# Patient Record
Sex: FEMALE | Race: BLACK OR AFRICAN AMERICAN | Employment: UNEMPLOYED | URBAN - METROPOLITAN AREA
[De-identification: names, ages, dates, MRNs, and addresses within clinical notes are randomized per-mention and may not be internally consistent; named-entity substitution may affect disease eponyms.]

---

## 2017-05-19 ENCOUNTER — APPOINTMENT (OUTPATIENT)
Dept: GENERAL RADIOLOGY | Age: 52
End: 2017-05-19
Attending: EMERGENCY MEDICINE
Payer: SUBSIDIZED

## 2017-05-19 ENCOUNTER — HOSPITAL ENCOUNTER (EMERGENCY)
Age: 52
Discharge: HOME | End: 2017-05-20
Attending: EMERGENCY MEDICINE
Payer: SUBSIDIZED

## 2017-05-19 DIAGNOSIS — R07.89 ATYPICAL CHEST PAIN: Primary | ICD-10-CM

## 2017-05-19 LAB
ALBUMIN SERPL BCP-MCNC: 3.7 G/DL (ref 3.5–5)
ALBUMIN/GLOB SERPL: 1.1 {RATIO} (ref 1.1–2.2)
ALP SERPL-CCNC: 55 U/L (ref 45–117)
ALT SERPL-CCNC: 22 U/L (ref 12–78)
ANION GAP BLD CALC-SCNC: 10 MMOL/L (ref 5–15)
AST SERPL W P-5'-P-CCNC: 17 U/L (ref 15–37)
BASOPHILS # BLD AUTO: 0 K/UL (ref 0–0.1)
BASOPHILS # BLD: 1 % (ref 0–1)
BILIRUB SERPL-MCNC: 0.2 MG/DL (ref 0.2–1)
BUN SERPL-MCNC: 15 MG/DL (ref 6–20)
BUN/CREAT SERPL: 19 (ref 12–20)
CALCIUM SERPL-MCNC: 8.7 MG/DL (ref 8.5–10.1)
CHLORIDE SERPL-SCNC: 110 MMOL/L (ref 97–108)
CK MB CFR SERPL CALC: 0.2 % (ref 0–2.5)
CK MB SERPL-MCNC: 1.3 NG/ML (ref 5–25)
CK SERPL-CCNC: 526 U/L (ref 26–192)
CO2 SERPL-SCNC: 24 MMOL/L (ref 21–32)
CREAT SERPL-MCNC: 0.8 MG/DL (ref 0.55–1.02)
EOSINOPHIL # BLD: 0.2 K/UL (ref 0–0.4)
EOSINOPHIL NFR BLD: 3 % (ref 0–7)
ERYTHROCYTE [DISTWIDTH] IN BLOOD BY AUTOMATED COUNT: 14.5 % (ref 11.5–14.5)
GLOBULIN SER CALC-MCNC: 3.5 G/DL (ref 2–4)
GLUCOSE SERPL-MCNC: 110 MG/DL (ref 65–100)
HCT VFR BLD AUTO: 32.1 % (ref 35–47)
HGB BLD-MCNC: 10.8 G/DL (ref 11.5–16)
LYMPHOCYTES # BLD AUTO: 38 % (ref 12–49)
LYMPHOCYTES # BLD: 2.4 K/UL (ref 0.8–3.5)
MCH RBC QN AUTO: 29.7 PG (ref 26–34)
MCHC RBC AUTO-ENTMCNC: 33.6 G/DL (ref 30–36.5)
MCV RBC AUTO: 88.2 FL (ref 80–99)
MONOCYTES # BLD: 0.4 K/UL (ref 0–1)
MONOCYTES NFR BLD AUTO: 7 % (ref 5–13)
NEUTS SEG # BLD: 3.2 K/UL (ref 1.8–8)
NEUTS SEG NFR BLD AUTO: 51 % (ref 32–75)
PLATELET # BLD AUTO: 216 K/UL (ref 150–400)
POTASSIUM SERPL-SCNC: 3.7 MMOL/L (ref 3.5–5.1)
PROT SERPL-MCNC: 7.2 G/DL (ref 6.4–8.2)
RBC # BLD AUTO: 3.64 M/UL (ref 3.8–5.2)
SODIUM SERPL-SCNC: 144 MMOL/L (ref 136–145)
TROPONIN I SERPL-MCNC: <0.04 NG/ML
TROPONIN I SERPL-MCNC: <0.04 NG/ML
WBC # BLD AUTO: 6.3 K/UL (ref 3.6–11)

## 2017-05-19 PROCEDURE — 80053 COMPREHEN METABOLIC PANEL: CPT | Performed by: EMERGENCY MEDICINE

## 2017-05-19 PROCEDURE — 99285 EMERGENCY DEPT VISIT HI MDM: CPT

## 2017-05-19 PROCEDURE — 82553 CREATINE MB FRACTION: CPT | Performed by: EMERGENCY MEDICINE

## 2017-05-19 PROCEDURE — 85025 COMPLETE CBC W/AUTO DIFF WBC: CPT | Performed by: EMERGENCY MEDICINE

## 2017-05-19 PROCEDURE — 36415 COLL VENOUS BLD VENIPUNCTURE: CPT | Performed by: EMERGENCY MEDICINE

## 2017-05-19 PROCEDURE — 71020 XR CHEST PA LAT: CPT

## 2017-05-19 PROCEDURE — 84484 ASSAY OF TROPONIN QUANT: CPT | Performed by: EMERGENCY MEDICINE

## 2017-05-19 PROCEDURE — 82550 ASSAY OF CK (CPK): CPT | Performed by: EMERGENCY MEDICINE

## 2017-05-19 PROCEDURE — 74011250637 HC RX REV CODE- 250/637: Performed by: EMERGENCY MEDICINE

## 2017-05-19 PROCEDURE — 93005 ELECTROCARDIOGRAM TRACING: CPT

## 2017-05-19 RX ORDER — DIAZEPAM 5 MG/1
5 TABLET ORAL
Status: COMPLETED | OUTPATIENT
Start: 2017-05-19 | End: 2017-05-19

## 2017-05-19 RX ADMIN — DIAZEPAM 5 MG: 5 TABLET ORAL at 20:32

## 2017-05-20 VITALS
SYSTOLIC BLOOD PRESSURE: 125 MMHG | DIASTOLIC BLOOD PRESSURE: 76 MMHG | TEMPERATURE: 98 F | HEIGHT: 63 IN | BODY MASS INDEX: 38.98 KG/M2 | OXYGEN SATURATION: 100 % | HEART RATE: 73 BPM | RESPIRATION RATE: 15 BRPM | WEIGHT: 220 LBS

## 2017-05-20 LAB
ATRIAL RATE: 86 BPM
CALCULATED P AXIS, ECG09: 43 DEGREES
CALCULATED R AXIS, ECG10: -3 DEGREES
CALCULATED T AXIS, ECG11: 17 DEGREES
DIAGNOSIS, 93000: NORMAL
P-R INTERVAL, ECG05: 146 MS
Q-T INTERVAL, ECG07: 364 MS
QRS DURATION, ECG06: 74 MS
QTC CALCULATION (BEZET), ECG08: 435 MS
VENTRICULAR RATE, ECG03: 86 BPM

## 2017-05-20 NOTE — ED NOTES
Updated pt that are waiting for repeat lab results to come back, prior to providing with crackers. Pt voices understanding.

## 2017-05-20 NOTE — ED NOTES
Patient reports CP 3/10 at this time. She is in NAD and family is at bedside. VSS. SR on cardiac monitor. Repeat Troponin due at 2230. Will continue to monitor.

## 2017-05-20 NOTE — ED NOTES
Bedside shift change report given to Bertram Rose RN (oncoming nurse) by Saira Vang RN (offgoing nurse). Report included the following information SBAR, ED Summary, Intake/Output, MAR, Recent Results and Cardiac Rhythm NSR.

## 2017-05-20 NOTE — ED NOTES
Assumed care of pt. Pt resting in stretcher. Call bell within reach. Awaiting repeat lab. Pain currently a 1/10, denying need for any medications at this time. Requesting some crackers.  Will speak with MD.

## 2017-05-20 NOTE — ED NOTES
Spoke with Dr. Manju Celeste about pt ready to go home, and recent lab result. Dr. Manju Celeste reporting will get pt discharge paperwork ready. Pt relayed information. No other complaints voiced at this time.

## 2017-05-20 NOTE — ED NOTES
Pt resting in stretcher. Call bell within reach. Updated on plan of care. Awaiting reeval by MD.     Provided with saltines for comfort measures. No other complaints voiced at this time.

## 2017-05-20 NOTE — ED NOTES
Repeat Troponin drawn from left AC PIV. Patient watching television with family and is in NAD. She rates CP 1/10. VSS. Updated patient and family on time frame for Troponin result. Will continue to monitor.

## 2017-05-20 NOTE — ED NOTES
Discharge instructions reviewed with pt and given by Dr. Luis Thomas. IV discontinued with catheter intact. Taken off of monitor. Escorted out of ED in wheelchair with tech. Adult female visitors to assist pt home.

## 2017-05-20 NOTE — ED PROVIDER NOTES
HPI Comments: Meri Mata is a 47 y.o. female with h/o DM and HTN who presents via EMS to the ED c/o tight sharp CP with onset during an argument with her son x ~2.5 hours PTA. Additionally, she notes SOB and diaphoresis with onset of CP. Pt reports her CP waxing and waning during her argument with her son, which lasted a total of about ten minutes. She states having similar chest pain in the past triggered by arguments with her son due to her being anxious and getting to excited. She reports feeling better now, and denies sharp CP, SOB or diaphoresis. Pt denies doing any recent heavy lifting. Pt denies FHx of MI. Pt denies h/o blood clots, any recent travel or surgeries or being on any hormone therapy. Pt denies abd pain, nausea, vomiting, diarrhea, fever, chills or any urinary sxs. PCP: None    Social Hx: -tobacco, -EtOH, -Illicit Drugs      There are no other complaints, changes or physical findings at this time. The history is provided by the patient. Past Medical History:   Diagnosis Date    Diabetes ([address]     Hypertension        Past Surgical History:   Procedure Laterality Date    ABDOMEN SURGERY PROC UNLISTED      Cholecystectomy    HX GYN       x2    HX ORTHOPAEDIC      left foot surgery         History reviewed. No pertinent family history. Social History     Social History    Marital status:      Spouse name: N/A    Number of children: N/A    Years of education: N/A     Occupational History    Not on file. Social History Main Topics    Smoking status: Never Smoker    Smokeless tobacco: Not on file    Alcohol use No    Drug use: No    Sexual activity: Not on file     Other Topics Concern    Not on file     Social History Narrative         ALLERGIES: Pcn [penicillins]    Review of Systems   Constitutional: Positive for diaphoresis (resolved). Negative for chills, fatigue and fever. HENT: Negative for congestion, rhinorrhea and sore throat.     Eyes: Negative for pain, discharge and visual disturbance. Respiratory: Positive for chest tightness and shortness of breath (resolved). Negative for cough and wheezing. Cardiovascular: Positive for chest pain (sharp pain resolved). Negative for palpitations and leg swelling. Gastrointestinal: Negative for abdominal pain, constipation, diarrhea, nausea and vomiting. Genitourinary: Negative for dysuria, frequency and hematuria. Musculoskeletal: Negative for arthralgias, back pain and myalgias. Skin: Negative for rash. Neurological: Negative for dizziness, weakness, light-headedness and headaches. Psychiatric/Behavioral: Negative. All other systems reviewed and are negative. Vitals:    05/19/17 2230 05/19/17 2300 05/19/17 2330 05/20/17 0001   BP: 121/79 124/85 130/82 125/76   Pulse: 73 77 72 73   Resp: 20 19 24 15   Temp:       SpO2: 100% 99% 100% 100%   Weight:       Height:                Physical Exam   Constitutional: She is oriented to person, place, and time. She appears well-developed and well-nourished. No distress. HENT:   Head: Normocephalic and atraumatic. Eyes: EOM are normal. Right eye exhibits no discharge. Left eye exhibits no discharge. No scleral icterus. Neck: Normal range of motion. Neck supple. No tracheal deviation present. Cardiovascular: Normal rate, regular rhythm, normal heart sounds and intact distal pulses. Exam reveals no gallop and no friction rub. No murmur heard. Pulmonary/Chest: Effort normal and breath sounds normal. No respiratory distress. She has no wheezes. She has no rales. L anterior chest wall tenderness   Abdominal: Soft. She exhibits no distension. There is no tenderness. Musculoskeletal: Normal range of motion. She exhibits no edema. Lymphadenopathy:     She has no cervical adenopathy. Neurological: She is alert and oriented to person, place, and time. No focal neuro deficits   Skin: Skin is warm and dry. No rash noted.    Psychiatric: She has a normal mood and affect. Anxious   Nursing note and vitals reviewed. MDM  Number of Diagnoses or Management Options  Atypical chest pain:   Diagnosis management comments:     Patient presents to ED with left sided chest pain that began while arguing with her son. She has reproducible chest wall tenderness on exam.  Differential includes musculoskeletal pain, costochondritis, anxiety, atypical chest pain, stable angina, unstable angina, MI, pleurisy, costochondritis, pneumonia, bronchitis, MSK pain. Do not suspect dissection or PE (Well's score 0). - CBC, CMP, troponin  - CXR           Amount and/or Complexity of Data Reviewed  Clinical lab tests: ordered and reviewed  Tests in the radiology section of CPT®: ordered and reviewed  Tests in the medicine section of CPT®: ordered and reviewed  Review and summarize past medical records: yes  Independent visualization of images, tracings, or specimens: yes    Patient Progress  Patient progress: stable      Procedures    EKG interpretation: (Preliminary) 19:09  Rhythm: normal sinus rhythm; and regular . Rate (approx.): 86 bpm; Axis: normal; CT interval: normal; QRS interval: normal ; ST/T wave: normal  Written by Naila Ferguson ED Scribe, as dictated by Mehdi Mcadams MD    PROGRESS NOTE:  10:00 PM  Troponin negative x 1. Patient reports she is feeling better and denies chest pain. Will repeat troponin and discharge with cardiology follow up (low Heart score) if troponin is negative. In anticipation of discharge, discussed the following with patient:    Discussed diagnosis, follow up, return instructions, test results, x-rays and medications with the patient and/or family. The patient and/or family have been given the opportunity to ask questions. The patient and/or family express understanding of the plan of care, follow-up appointments and return instructions.   The patient and/or family agree to follow up with cardiology as directed and to return immediately if the chest pain worsens. The patient and family express understanding that although cardiac testing at this time is negative, a cardiac problem could still be present and that a follow-up appointment with a cardiologist for further evaluation and risk factor modification is necessary to complete the evaluation of this complaint. Provided customary return to ED instructions. Ardena Osler, MD    SIGN OUT:  10:30 PM  Patient's presentation, labs/imaging and plan of care was reviewed with Joe Mckeon MD as part of sign out. They will wait for a repeat cardiac enzymes as part of the plan discussed with the patient. Joe Mckeon MD's assistance in completion of this plan is greatly appreciated but it should be noted that I will be the provider of record for this patient. This note is prepared by Isis Rees, acting as Scribe for MD Rui Clinton MD: The scribe's documentation has been prepared under my direction and personally reviewed by me in its entirety. I confirm that the note above accurately reflects all work, treatment, procedures, and medical decision making performed by me. Progress note:  12:27 AM  Pt noted to be feeling better, ready for discharge. Updated pt and/or family on all final lab and imaging findings. Will follow up as instructed. All questions have been answered, pt voiced understanding and agreement with plan. Specific return precautions provided as well as instructions to return to the ED should sx worsen at any time.   Written by Veronica Torres ED Scribe, as dictated by Joe Mckeon MD    LABORATORY TESTS:  Recent Results (from the past 12 hour(s))   EKG, 12 LEAD, INITIAL    Collection Time: 05/19/17  7:09 PM   Result Value Ref Range    Ventricular Rate 86 BPM    Atrial Rate 86 BPM    P-R Interval 146 ms    QRS Duration 74 ms    Q-T Interval 364 ms    QTC Calculation (Bezet) 435 ms    Calculated P Axis 43 degrees Calculated R Axis -3 degrees    Calculated T Axis 17 degrees    Diagnosis       Normal sinus rhythm  Possible Left atrial enlargement  Left ventricular hypertrophy  Abnormal ECG  No previous ECGs available     CBC WITH AUTOMATED DIFF    Collection Time: 05/19/17  8:27 PM   Result Value Ref Range    WBC 6.3 3.6 - 11.0 K/uL    RBC 3.64 (L) 3.80 - 5.20 M/uL    HGB 10.8 (L) 11.5 - 16.0 g/dL    HCT 32.1 (L) 35.0 - 47.0 %    MCV 88.2 80.0 - 99.0 FL    MCH 29.7 26.0 - 34.0 PG    MCHC 33.6 30.0 - 36.5 g/dL    RDW 14.5 11.5 - 14.5 %    PLATELET 850 599 - 561 K/uL    NEUTROPHILS 51 32 - 75 %    LYMPHOCYTES 38 12 - 49 %    MONOCYTES 7 5 - 13 %    EOSINOPHILS 3 0 - 7 %    BASOPHILS 1 0 - 1 %    ABS. NEUTROPHILS 3.2 1.8 - 8.0 K/UL    ABS. LYMPHOCYTES 2.4 0.8 - 3.5 K/UL    ABS. MONOCYTES 0.4 0.0 - 1.0 K/UL    ABS. EOSINOPHILS 0.2 0.0 - 0.4 K/UL    ABS. BASOPHILS 0.0 0.0 - 0.1 K/UL   METABOLIC PANEL, COMPREHENSIVE    Collection Time: 05/19/17  8:27 PM   Result Value Ref Range    Sodium 144 136 - 145 mmol/L    Potassium 3.7 3.5 - 5.1 mmol/L    Chloride 110 (H) 97 - 108 mmol/L    CO2 24 21 - 32 mmol/L    Anion gap 10 5 - 15 mmol/L    Glucose 110 (H) 65 - 100 mg/dL    BUN 15 6 - 20 MG/DL    Creatinine 0.80 0.55 - 1.02 MG/DL    BUN/Creatinine ratio 19 12 - 20      GFR est AA >60 >60 ml/min/1.73m2    GFR est non-AA >60 >60 ml/min/1.73m2    Calcium 8.7 8.5 - 10.1 MG/DL    Bilirubin, total 0.2 0.2 - 1.0 MG/DL    ALT (SGPT) 22 12 - 78 U/L    AST (SGOT) 17 15 - 37 U/L    Alk. phosphatase 55 45 - 117 U/L    Protein, total 7.2 6.4 - 8.2 g/dL    Albumin 3.7 3.5 - 5.0 g/dL    Globulin 3.5 2.0 - 4.0 g/dL    A-G Ratio 1.1 1.1 - 2.2     CK W/ REFLX CKMB    Collection Time: 05/19/17  8:27 PM   Result Value Ref Range     (H) 26 - 192 U/L   TROPONIN I    Collection Time: 05/19/17  8:27 PM   Result Value Ref Range    Troponin-I, Qt. <0.04 <0.05 ng/mL   CK-MB,QUANT.     Collection Time: 05/19/17  8:27 PM   Result Value Ref Range    CK - MB 1.3 <3.6 NG/ML    CK-MB Index 0.2 0 - 2.5     TROPONIN I    Collection Time: 05/19/17 10:43 PM   Result Value Ref Range    Troponin-I, Qt. <0.04 <0.05 ng/mL       IMAGING RESULTS:    XR CHEST PA LAT  CLINICAL HISTORY: Chest pain   INDICATION: Chest pain, hypertension, diabetes     COMPARISON: None     FINDINGS:   PA and lateral views of the chest are obtained. The cardiopericardial silhouette is within normal limits. There is no pleural  effusion, pneumothorax or focal consolidation present.     IMPRESSION  IMPRESSION: No acute intrathoracic disease. MEDICATIONS GIVEN:  Medications   diazePAM (VALIUM) tablet 5 mg (5 mg Oral Given 5/19/17 2032)       IMPRESSION:  1. Atypical chest pain        PLAN:  1. Current Discharge Medication List        2. Follow-up Information     Follow up With Details Comments Contact Info    None In 2 days Please follow up primary care provider None [phone] Patient stated that they have no PCP      Erma Rankin MD  Please follow up with cardiology as soon as possible [address]  [id number]  [address] [phone]      [facility] EMERGENCY DEPT  As needed, If symptoms worsen [address] [place] [address]  [phone]        Return to ED if worse       DISCHARGE NOTE:  12:27 AM  The patient's results have been reviewed with family and/or caregiver. They verbally convey their understanding and agreement of the patient's signs, symptoms, diagnosis, treatment, and prognosis. They additionally agree to follow up as recommended in the discharge instructions or to return to the Emergency Room should the patient's condition change prior to their follow-up appointment. The family and/or caregiver verbally agrees with the care-plan and all of their questions have been answered.  The discharge instructions have also been provided to the them along with educational information regarding the patient's diagnosis and a list of reasons why the patient would want to return to the ER prior to their follow-up appointment should their condition change. Written by SERGE Ramireziblillian, as dictated by Kamaljit Cooper MD.     This note is prepared by Santo Johnson, acting as Scribe for MD Kamaljit Nagy MD : The scribe's documentation has been prepared under my direction and personally reviewed by me in its entirety. I confirm that the note above accurately reflects all work, treatment, procedures, and medical decision making performed by me. This note will not be viewable in [address]

## 2017-05-20 NOTE — ED NOTES
Pt ringing out on call bell. Requesting how much longer. Advised that MD is in with critical patient and am uncertain of how much longer. Pt stating \"I'm ready to go home, I'm a diabetic. \"     Offered pt frozen dinner and orange juice. Pt declined. Requested some canelo crackers. Provided with canelo crackers. Advised when MD is next available will request that she come speak with pt. Pt reports understanding. No other requests at this time.

## 2017-05-20 NOTE — DISCHARGE INSTRUCTIONS
Chest Pain: Care Instructions  Your Care Instructions  There are many things that can cause chest pain. Some are not serious and will get better on their own in a few days. But some kinds of chest pain need more testing and treatment. Your doctor may have recommended a follow-up visit in the next 8 to 12 hours. If you are not getting better, you may need more tests or treatment. Even though your doctor has released you, you still need to watch for any problems. The doctor carefully checked you, but sometimes problems can develop later. If you have new symptoms or if your symptoms do not get better, get medical care right away. If you have worse or different chest pain or pressure that lasts more than 5 minutes or you passed out (lost consciousness), call 911 or seek other emergency help right away. A medical visit is only one step in your treatment. Even if you feel better, you still need to do what your doctor recommends, such as going to all suggested follow-up appointments and taking medicines exactly as directed. This will help you recover and help prevent future problems. How can you care for yourself at home? · Rest until you feel better. · Take your medicine exactly as prescribed. Call your doctor if you think you are having a problem with your medicine. · Do not drive after taking a prescription pain medicine. When should you call for help? Call 911 if:  · You passed out (lost consciousness). · You have severe difficulty breathing. · You have symptoms of a heart attack. These may include:  ¨ Chest pain or pressure, or a strange feeling in your chest.  ¨ Sweating. ¨ Shortness of breath. ¨ Nausea or vomiting. ¨ Pain, pressure, or a strange feeling in your back, neck, jaw, or upper belly or in one or both shoulders or arms. ¨ Lightheadedness or sudden weakness. ¨ A fast or irregular heartbeat.   After you call 911, the  may tell you to chew 1 adult-strength or 2 to 4 low-dose aspirin. Wait for an ambulance. Do not try to drive yourself. Call your doctor today if:  · You have any trouble breathing. · Your chest pain gets worse. · You are dizzy or lightheaded, or you feel like you may faint. · You are not getting better as expected. · You are having new or different chest pain. Where can you learn more? Go to http://carlitos-gaye.info/. Enter A120 in the search box to learn more about \"Chest Pain: Care Instructions. \"  Current as of: May 27, 2016  Content Version: 11.2  © 8762-3624 [employer]. Care instructions adapted under license by [employer] (which disclaims liability or warranty for this information). If you have questions about a medical condition or this instruction, always ask your healthcare professional. [address] any warranty or liability for your use of this information.

## 2019-01-31 ENCOUNTER — HOSPITAL ENCOUNTER (OUTPATIENT)
Dept: LAB | Age: 54
Discharge: HOME | End: 2019-01-31

## 2019-01-31 LAB
CREAT UR-MCNC: 114 MG/DL
MICROALBUMIN UR-MCNC: 1.96 MG/DL
MICROALBUMIN/CREAT UR-RTO: 17 MG/G (ref 0–30)

## 2019-01-31 PROCEDURE — 82570 ASSAY OF URINE CREATININE: CPT

## 2019-09-12 ENCOUNTER — HOSPITAL ENCOUNTER (OUTPATIENT)
Dept: LAB | Age: 54
Discharge: HOME | End: 2019-09-12

## 2019-09-12 LAB
CHOLEST SERPL-MCNC: 147 MG/DL
EST. AVERAGE GLUCOSE BLD GHB EST-MCNC: 128 MG/DL
HBA1C MFR BLD: 6.1 % (ref 4.2–6.3)
HDLC SERPL-MCNC: 56 MG/DL
HDLC SERPL: 2.6 {RATIO} (ref 0–5)
LDLC SERPL CALC-MCNC: 78.8 MG/DL (ref 0–100)
LIPID PROFILE,FLP: NORMAL
TRIGL SERPL-MCNC: 61 MG/DL (ref ?–150)
VLDLC SERPL CALC-MCNC: 12.2 MG/DL

## 2019-09-12 PROCEDURE — 83036 HEMOGLOBIN GLYCOSYLATED A1C: CPT

## 2019-09-12 PROCEDURE — 80061 LIPID PANEL: CPT

## 2021-02-26 ENCOUNTER — OFFICE VISIT (OUTPATIENT)
Dept: FAMILY MEDICINE CLINIC | Age: 56
End: 2021-02-26
Payer: MEDICAID

## 2021-02-26 VITALS
RESPIRATION RATE: 12 BRPM | HEART RATE: 74 BPM | DIASTOLIC BLOOD PRESSURE: 81 MMHG | BODY MASS INDEX: 40.57 KG/M2 | OXYGEN SATURATION: 100 % | WEIGHT: 229 LBS | SYSTOLIC BLOOD PRESSURE: 131 MMHG | TEMPERATURE: 96.8 F | HEIGHT: 63 IN

## 2021-02-26 DIAGNOSIS — Z80.0 FAMILY HISTORY OF COLON CANCER: ICD-10-CM

## 2021-02-26 DIAGNOSIS — E11.9 CONTROLLED TYPE 2 DIABETES MELLITUS WITHOUT COMPLICATION, WITHOUT LONG-TERM CURRENT USE OF INSULIN (HCC): ICD-10-CM

## 2021-02-26 DIAGNOSIS — Z11.59 NEED FOR HEPATITIS C SCREENING TEST: ICD-10-CM

## 2021-02-26 DIAGNOSIS — Z76.89 ENCOUNTER TO ESTABLISH CARE: Primary | ICD-10-CM

## 2021-02-26 DIAGNOSIS — E66.01 OBESITY, MORBID (HCC): ICD-10-CM

## 2021-02-26 DIAGNOSIS — I10 ESSENTIAL HYPERTENSION WITH GOAL BLOOD PRESSURE LESS THAN 130/80: ICD-10-CM

## 2021-02-26 DIAGNOSIS — M25.561 CHRONIC PAIN OF RIGHT KNEE: ICD-10-CM

## 2021-02-26 DIAGNOSIS — J45.20 MILD INTERMITTENT ASTHMA WITHOUT COMPLICATION: ICD-10-CM

## 2021-02-26 DIAGNOSIS — G89.29 CHRONIC PAIN OF RIGHT KNEE: ICD-10-CM

## 2021-02-26 DIAGNOSIS — Z85.3 HISTORY OF BREAST CANCER: ICD-10-CM

## 2021-02-26 PROBLEM — C50.919 BREAST CANCER (HCC): Status: ACTIVE | Noted: 2019-01-01

## 2021-02-26 PROCEDURE — 99204 OFFICE O/P NEW MOD 45 MIN: CPT | Performed by: NURSE PRACTITIONER

## 2021-02-26 RX ORDER — LETROZOLE 2.5 MG/1
1 TABLET, FILM COATED ORAL DAILY
COMMUNITY
Start: 2021-01-07

## 2021-02-26 RX ORDER — AMLODIPINE BESYLATE 5 MG/1
1 TABLET ORAL DAILY
COMMUNITY
Start: 2020-12-18 | End: 2021-02-26 | Stop reason: SDUPTHER

## 2021-02-26 RX ORDER — LISINOPRIL 2.5 MG/1
2.5 TABLET ORAL DAILY
Qty: 90 TAB | Refills: 3 | Status: SHIPPED | OUTPATIENT
Start: 2021-02-26 | End: 2021-03-02 | Stop reason: SINTOL

## 2021-02-26 RX ORDER — GABAPENTIN 300 MG/1
1 CAPSULE ORAL 3 TIMES DAILY
COMMUNITY
Start: 2020-12-18

## 2021-02-26 RX ORDER — ALBUTEROL SULFATE 90 UG/1
2 AEROSOL, METERED RESPIRATORY (INHALATION)
Qty: 1 INHALER | Refills: 5 | Status: SHIPPED | OUTPATIENT
Start: 2021-02-26 | End: 2022-05-27

## 2021-02-26 RX ORDER — IBUPROFEN 600 MG/1
1 TABLET ORAL
COMMUNITY
Start: 2020-12-18 | End: 2021-04-07 | Stop reason: ALTCHOICE

## 2021-02-26 RX ORDER — LORATADINE 10 MG/1
10 TABLET ORAL DAILY
COMMUNITY
End: 2022-03-02 | Stop reason: SDUPTHER

## 2021-02-26 RX ORDER — AMLODIPINE BESYLATE 5 MG/1
5 TABLET ORAL DAILY
Qty: 90 TAB | Refills: 3 | Status: SHIPPED | OUTPATIENT
Start: 2021-02-26 | End: 2022-03-02 | Stop reason: SDUPTHER

## 2021-02-26 RX ORDER — METFORMIN HYDROCHLORIDE 500 MG/1
500 TABLET ORAL
Qty: 270 TAB | Refills: 3 | Status: SHIPPED | OUTPATIENT
Start: 2021-02-26 | End: 2022-03-02 | Stop reason: SDUPTHER

## 2021-02-26 NOTE — PROGRESS NOTES
Chief Complaint   Patient presents with   [address]    Diabetes    Hypertension     PCP - [facility] until 11/2019. Oncology/Breast Surgeon - Dr. Deedee Anand performed hysterectomy. Pt would like penicillins removed allergy list.     1. Have you been to the ER, urgent care clinic since your last visit? Hospitalized since your last visit? No    2. Have you seen or consulted any other health care providers outside of the [address] [place] [address] since your last visit? Include any pap smears or colon screening. No    Flu shot- Pt declined  Colonoscopy - Pt scheduled for next month  NO NEW IMMUNIZATIONS SHOWN ON VIIS.      Health Maintenance Due   Topic Date Due    Hepatitis C Screening  1965    DTaP/Tdap/Td series (1 - Tdap) 01/02/1985    Breast Cancer Screen Mammogram  01/02/2004    Shingrix Vaccine Age 50> (1 of 2) 01/02/2014    Colorectal Cancer Screening Combo  01/02/2014

## 2021-02-26 NOTE — PATIENT INSTRUCTIONS
Knee Arthritis: Exercises  Introduction  Here are some examples of exercises for you to try. The exercises may be suggested for a condition or for rehabilitation. Start each exercise slowly. Ease off the exercises if you start to have pain. You will be told when to start these exercises and which ones will work best for you. How to do the exercises  Knee flexion with heel slide   1. Lie on your back with your knees bent. 2. Slide your heel back by bending your affected knee as far as you can. Then hook your other foot around your ankle to help pull your heel even farther back. 3. Hold for about 6 seconds, then rest for up to 10 seconds. 4. Repeat 8 to 12 times. 5. Switch legs and repeat steps 1 through 4, even if only one knee is sore. Quad sets   1. Sit with your affected leg straight and supported on the floor or a firm bed. Place a small, rolled-up towel under your knee. Your other leg should be bent, with that foot flat on the floor. 2. Tighten the thigh muscles of your affected leg by pressing the back of your knee down into the towel. 3. Hold for about 6 seconds, then rest for up to 10 seconds. 4. Repeat 8 to 12 times. 5. Switch legs and repeat steps 1 through 4, even if only one knee is sore. Straight-leg raises to the front   1. Lie on your back with your good knee bent so that your foot rests flat on the floor. Your affected leg should be straight. Make sure that your low back has a normal curve. You should be able to slip your hand in between the floor and the small of your back, with your palm touching the floor and your back touching the back of your hand. 2. Tighten the thigh muscles in your affected leg by pressing the back of your knee flat down to the floor. Hold your knee straight. 3. Keeping the thigh muscles tight and your leg straight, lift your affected leg up so that your heel is about 12 inches off the floor. Hold for about 6 seconds, then lower slowly.   4. Relax for up to 10 seconds between repetitions. 5. Repeat 8 to 12 times. 6. Switch legs and repeat steps 1 through 5, even if only one knee is sore. Active knee flexion   1. Lie on your stomach with your knees straight. If your kneecap is uncomfortable, roll up a washcloth and put it under your leg just above your kneecap. 2. Lift the foot of your affected leg by bending the knee so that you bring the foot up toward your buttock. If this motion hurts, try it without bending your knee quite as far. This may help you avoid any painful motion. 3. Slowly move your leg up and down. 4. Repeat 8 to 12 times. 5. Switch legs and repeat steps 1 through 4, even if only one knee is sore. Quadriceps stretch (facedown)   1. Lie flat on your stomach, and rest your face on the floor. 2. Wrap a towel or belt strap around the lower part of your affected leg. Then use the towel or belt strap to slowly pull your heel toward your buttock until you feel a stretch. 3. Hold for about 15 to 30 seconds, then relax your leg against the towel or belt strap. 4. Repeat 2 to 4 times. 5. Switch legs and repeat steps 1 through 4, even if only one knee is sore. Stationary exercise bike   1. If you do not have a stationary exercise bike at home, you can find one to ride at your local health club or community center. 2. Adjust the height of the bike seat so that your knee is slightly bent when your leg is extended downward. If your knee hurts when the pedal reaches the top, you can raise the seat so that your knee does not bend as much. 3. Start slowly. At first, try to do 5 to 10 minutes of cycling with little to no resistance. Then increase your time and the resistance bit by bit until you can do 20 to 30 minutes without pain. 4. If you start to have pain, rest your knee until your pain gets back to the level that is normal for you. Or cycle for less time or with less effort. Follow-up care is a key part of your treatment and safety.  Be sure to make and go to all appointments, and call your doctor if you are having problems. It's also a good idea to know your test results and keep a list of the medicines you take. Where can you learn more? Go to http://www.wylie.com/  Enter C159 in the search box to learn more about \"Knee Arthritis: Exercises. \"  Current as of: March 2, 2020               Content Version: 12.6  © 2006-2020 [employer]. Care instructions adapted under license by [employer] (which disclaims liability or warranty for this information). If you have questions about a medical condition or this instruction, always ask your healthcare professional. [address] any warranty or liability for your use of this information.

## 2021-02-26 NOTE — LETTER
NOTIFICATION RETURN TO WORK 
 
2/26/2021 9:32 AM 
 
Ms. Ana Mcnulty [address] [place] [address] [id number] To Whom It May Concern: 
 
Ana Mcnulty is currently under the care of [facility]. She was seen in office today, 2/26/21. If there are questions or concerns please have the patient contact our office. Sincerely, Carlos Lieberman NP

## 2021-02-26 NOTE — PROGRESS NOTES
Carline Madrid (: 1965) is a 61 y.o. female, established patient, here for evaluation of the following chief complaint(s):  Establish Care, Diabetes, and Hypertension       ASSESSMENT/PLAN:  1. Encounter to establish care    2. Controlled type 2 diabetes mellitus without complication, without long-term current use of insulin (HCC)  -     metFORMIN (GLUCOPHAGE) 500 mg tablet; Take 1 Tab by mouth three (3) times daily (with meals). , Normal, Disp-270 Tab, R-3  -     CBC WITH AUTOMATED DIFF; Future  -     METABOLIC PANEL, COMPREHENSIVE; Future  -     HEMOGLOBIN A1C WITH EAG; Future  -     TSH 3RD GENERATION; Future  -     LIPID PANEL; Future  -     MICROALBUMIN, UR, RAND W/ MICROALB/CREAT RATIO; Future  -     URINALYSIS W/ REFLEX CULTURE; Future  -     lisinopriL (PRINIVIL, ZESTRIL) 2.5 mg tablet; Take 1 Tab by mouth daily. , Normal, Disp-90 Tab, R-3    3. Essential hypertension with goal blood pressure less than 130/80  -     amLODIPine (NORVASC) 5 mg tablet; Take 1 Tab by mouth daily. , Normal, Disp-90 Tab, R-3  -     CBC WITH AUTOMATED DIFF; Future  -     METABOLIC PANEL, COMPREHENSIVE; Future  -     TSH 3RD GENERATION; Future  -     LIPID PANEL; Future  -     MICROALBUMIN, UR, RAND W/ MICROALB/CREAT RATIO; Future  -     URINALYSIS W/ REFLEX CULTURE; Future  -     lisinopriL (PRINIVIL, ZESTRIL) 2.5 mg tablet; Take 1 Tab by mouth daily. , Normal, Disp-90 Tab, R-3    4. Chronic pain of right knee  -     XR KNEE RT 3 V; Future    5. History of breast cancer    6. Mild intermittent asthma without complication  -     albuterol (PROVENTIL HFA, VENTOLIN HFA, PROAIR HFA) 90 mcg/actuation inhaler; Take 2 Puffs by inhalation every four (4) hours as needed for Wheezing or Shortness of Breath., Normal, Disp-1 Inhaler, R-5    7. Obesity, morbid ([address]    8. Family history of colon cancer    9. Need for hepatitis C screening test  -     HEPATITIS C AB;  Future        Return in about 4 months (around 2021). SUBJECTIVE/OBJECTIVE:  HPI  Patient comes in today to establish care, has diabetes, HTN, hx breast cancer. PCP - [id number] until 2019. Oncology/Breast Surgeon - Dr. Milton Mejia performed hysterectomy. Saw [facility] for echo/cardiac workup for chemo     Pt would like penicillins removed allergy list.     Hx HTN, diagnosed about 10 years ago. Taking amlodipine 5mg. Denies chest pains, SOB. Has some wheezing, but has hx of asthma. No cough, night sweats, unexplained weight loss    Hx diabetes since . Taking metformin 500mg TID with meals. Sometimes does not take 3 times daily. May miss a 3rd dose 3 days weekly. tolerating okay. No diarrhea. FBG usually 80-90s    Hx right breast cancer. HER2 positve. Dx in . Had lumpectomy and lymph node removal (negative bx) in 2019. Had chemo and radiation treatment. Last chemo treatment 2020 and last radiation treatment 2020. Had hysterectomy and BSO in Dec 2020. Had postop complications of wound infection. Had 6 different parasites. Family hx of breast cancer in [facility] and the [facility] and paternal cousins. Had mammogram done in 2020. Due for colonoscopy next month. States she has genetic carrier for colon cancer.   Brother dx with colon cancer at 22yo and father diagnosed with colon cancer age 37yo  Allergies   Allergen Reactions    Pcn [Penicillins] Itching       Past Medical History:   Diagnosis Date    Asthma     [facility] 2019    Diabetes ([facility]     Family history of breast cancer     Family history of colon cancer     Hypertension        Past Surgical History:   Procedure Laterality Date    HX BREAST LUMPECTOMY Right 2019    HX GYN       x2    HX HYSTERECTOMY  2020    HX LYMPH NODE DISSECTION Right     HX ORTHOPAEDIC      left foot surgery    [place] ABDOMEN SURGERY PROC UNLISTED      Cholecystectomy Social History     Socioeconomic History    Marital status:      Spouse name: Not on file    Number of children: Not on file    Years of education: Not on file    Highest education level: Not on file   Occupational History    Not on file   Social Needs    Financial resource strain: Not on file    Food insecurity     Worry: Not on file     Inability: Not on file    Transportation needs     Medical: Not on file     Non-medical: Not on file   Tobacco Use    Smoking status: Never Smoker    Smokeless tobacco: Never Used   Substance and Sexual Activity    Alcohol use: No    Drug use: No    Sexual activity: Not Currently   Lifestyle    Physical activity     Days per week: Not on file     Minutes per session: Not on file    Stress: Not on file   Relationships    Social connections     Talks on phone: Not on file     Gets together: Not on file     Attends Yazdanism service: Not on file     Active member of club or organization: Not on file     Attends meetings of clubs or organizations: Not on file     Relationship status: Not on file    Intimate partner violence     Fear of current or ex partner: Not on file     Emotionally abused: Not on file     Physically abused: Not on file     Forced sexual activity: Not on file   Other Topics Concern    Not on file   Social History Narrative    Not on file       Family History   Problem Relation Age of Onset    Colon Cancer Father     Colon Cancer Brother     Breast Cancer Paternal Grandmother        Current Outpatient Medications   Medication Sig    letrozole (FEMARA) 2.5 mg tablet Take 1 Tab by mouth daily.  gabapentin (NEURONTIN) 300 mg capsule Take 1 Cap by mouth three (3) times daily.  ibuprofen (MOTRIN) 600 mg tablet Take 1 Tab by mouth three (3) times daily as needed.  VIT C-VIT D3-E-ZINC-ELDERBERRY PO Take  by mouth.  loratadine (Claritin) 10 mg tablet Take 10 mg by mouth daily.     albuterol (PROVENTIL HFA, VENTOLIN HFA, PROAIR HFA) 90 mcg/actuation inhaler Take 2 Puffs by inhalation every four (4) hours as needed for Wheezing or Shortness of Breath.  metFORMIN (GLUCOPHAGE) 500 mg tablet Take 1 Tab by mouth three (3) times daily (with meals).  amLODIPine (NORVASC) 5 mg tablet Take 1 Tab by mouth daily.  lisinopriL (PRINIVIL, ZESTRIL) 2.5 mg tablet Take 1 Tab by mouth daily. No current facility-administered medications for this visit. Review of Systems   Constitutional: Negative for appetite change, chills, diaphoresis, fatigue, fever and unexpected weight change. Respiratory: Positive for wheezing. Negative for cough and shortness of breath. Cardiovascular: Negative for chest pain, palpitations and leg swelling. Gastrointestinal: Negative for abdominal pain, blood in stool, constipation, diarrhea, nausea and vomiting. Endocrine: Negative for cold intolerance and heat intolerance. Genitourinary: Negative for dysuria, flank pain, frequency, hematuria and urgency. Musculoskeletal: Positive for arthralgias (right knee pain, bilateral foot pain). Negative for back pain and myalgias. Skin: Negative. Neurological: Positive for numbness. Negative for dizziness, speech difficulty, light-headedness and headaches. Psychiatric/Behavioral: Negative for dysphoric mood and sleep disturbance. The patient is not nervous/anxious. Vitals:    02/26/21 0818   BP: 131/81   Pulse: 74   Resp: 12   Temp: 96.8 °F (36 °C)   TempSrc: Skin   SpO2: 100%   Weight: 229 lb (103.9 kg)   Height: 5' 3\" (1.6 m)     Physical Exam  Vitals signs reviewed. Constitutional:       Appearance: Normal appearance. She is well-developed and well-groomed. HENT:      Right Ear: Hearing normal.      Left Ear: Hearing normal.   Neck:      Thyroid: No thyromegaly. Cardiovascular:      Rate and Rhythm: Normal rate and regular rhythm. Pulses:           Dorsalis pedis pulses are 2+ on the right side and 2+ on the left side.       Heart sounds: Normal heart sounds, S1 normal and S2 normal.   Pulmonary:      Effort: Pulmonary effort is normal.      Breath sounds: Normal breath sounds. Abdominal:      General: Bowel sounds are normal.      Palpations: Abdomen is soft. Tenderness: There is no abdominal tenderness. Musculoskeletal:      Right lower leg: No edema. Left lower leg: No edema. Lymphadenopathy:      Head:      Right side of head: No submental, submandibular, tonsillar, preauricular or posterior auricular adenopathy. Left side of head: No submental, submandibular, tonsillar, preauricular or posterior auricular adenopathy. Cervical: No cervical adenopathy. Upper Body:      Right upper body: No supraclavicular adenopathy. Left upper body: No supraclavicular adenopathy. Skin:     General: Skin is warm and dry. Neurological:      Mental Status: She is alert and oriented to person, place, and time. Psychiatric:         Attention and Perception: Attention normal.         Mood and Affect: Mood and affect normal.         Speech: Speech normal.         Behavior: Behavior normal. Behavior is cooperative. Thought Content: Thought content normal.         Cognition and Memory: Cognition and memory normal.       On this date 02/26/21 I have spent 45 minutes reviewing previous notes, test results and face to face with the patient discussing the diagnosis and importance of compliance with the treatment plan as well as documenting on the day of the visit. An electronic signature was used to authenticate this note.   -- Mar Peter NP

## 2021-03-02 ENCOUNTER — TELEPHONE (OUTPATIENT)
Dept: FAMILY MEDICINE CLINIC | Age: 56
End: 2021-03-02

## 2021-03-02 DIAGNOSIS — E11.9 CONTROLLED TYPE 2 DIABETES MELLITUS WITHOUT COMPLICATION, WITHOUT LONG-TERM CURRENT USE OF INSULIN (HCC): Primary | ICD-10-CM

## 2021-03-02 DIAGNOSIS — I10 ESSENTIAL HYPERTENSION WITH GOAL BLOOD PRESSURE LESS THAN 130/80: ICD-10-CM

## 2021-03-02 RX ORDER — LOSARTAN POTASSIUM 25 MG/1
25 TABLET ORAL DAILY
Qty: 90 TAB | Refills: 3 | Status: SHIPPED | OUTPATIENT
Start: 2021-03-02 | End: 2022-03-17

## 2021-03-02 NOTE — TELEPHONE ENCOUNTER
Stop lisinopril due to ACEI cough. Start losartan as prescribed below. Prescription updated at pharmacy    Orders Placed This Encounter    losartan (COZAAR) 25 mg tablet     Sig: Take 1 Tab by mouth daily.      Dispense:  90 Tab     Refill:  3

## 2021-03-02 NOTE — TELEPHONE ENCOUNTER
----- Message from Óscar Solo sent at 3/2/2021  9:58 AM EST -----  Regarding: [place]/telephone  Pt stated the medication Lisinopril makes her cough and she is requesting a different medication. Pts number is [phone] and Cu[place]htemoc Galan number is [phone].

## 2021-03-13 LAB
ALBUMIN SERPL-MCNC: 4.2 G/DL (ref 3.8–4.9)
ALBUMIN/CREAT UR: 8 MG/G CREAT (ref 0–29)
ALBUMIN/GLOB SERPL: 1.6 {RATIO} (ref 1.2–2.2)
ALP SERPL-CCNC: 79 IU/L (ref 39–117)
ALT SERPL-CCNC: 14 IU/L (ref 0–32)
APPEARANCE UR: CLEAR
AST SERPL-CCNC: 17 IU/L (ref 0–40)
BACTERIA #/AREA URNS HPF: ABNORMAL /[HPF]
BASOPHILS # BLD AUTO: 0 X10E3/UL (ref 0–0.2)
BASOPHILS NFR BLD AUTO: 1 %
BILIRUB SERPL-MCNC: <0.2 MG/DL (ref 0–1.2)
BILIRUB UR QL STRIP: NEGATIVE
BUN SERPL-MCNC: 14 MG/DL (ref 6–24)
BUN/CREAT SERPL: 20 (ref 9–23)
CALCIUM SERPL-MCNC: 9.2 MG/DL (ref 8.7–10.2)
CASTS URNS QL MICRO: ABNORMAL /LPF
CHLORIDE SERPL-SCNC: 105 MMOL/L (ref 96–106)
CHOLEST SERPL-MCNC: 166 MG/DL (ref 100–199)
CO2 SERPL-SCNC: 24 MMOL/L (ref 20–29)
COLOR UR: YELLOW
CREAT SERPL-MCNC: 0.71 MG/DL (ref 0.57–1)
CREAT UR-MCNC: 149.6 MG/DL
EOSINOPHIL # BLD AUTO: 0.2 X10E3/UL (ref 0–0.4)
EOSINOPHIL NFR BLD AUTO: 4 %
EPI CELLS #/AREA URNS HPF: ABNORMAL /HPF (ref 0–10)
ERYTHROCYTE [DISTWIDTH] IN BLOOD BY AUTOMATED COUNT: 13.8 % (ref 11.7–15.4)
EST. AVERAGE GLUCOSE BLD GHB EST-MCNC: 126 MG/DL
GLOBULIN SER CALC-MCNC: 2.6 G/DL (ref 1.5–4.5)
GLUCOSE SERPL-MCNC: 72 MG/DL (ref 65–99)
GLUCOSE UR QL: NEGATIVE
HBA1C MFR BLD: 6 % (ref 4.8–5.6)
HCT VFR BLD AUTO: 34.7 % (ref 34–46.6)
HCV AB S/CO SERPL IA: <0.1 S/CO RATIO (ref 0–0.9)
HDLC SERPL-MCNC: 57 MG/DL
HGB BLD-MCNC: 11.5 G/DL (ref 11.1–15.9)
HGB UR QL STRIP: NEGATIVE
IMM GRANULOCYTES # BLD AUTO: 0 X10E3/UL (ref 0–0.1)
IMM GRANULOCYTES NFR BLD AUTO: 0 %
IMP & REVIEW OF LAB RESULTS: NORMAL
KETONES UR QL STRIP: NEGATIVE
LDLC SERPL CALC-MCNC: 90 MG/DL (ref 0–99)
LEUKOCYTE ESTERASE UR QL STRIP: NEGATIVE
LYMPHOCYTES # BLD AUTO: 1.5 X10E3/UL (ref 0.7–3.1)
LYMPHOCYTES NFR BLD AUTO: 33 %
Lab: NORMAL
MCH RBC QN AUTO: 29.4 PG (ref 26.6–33)
MCHC RBC AUTO-ENTMCNC: 33.1 G/DL (ref 31.5–35.7)
MCV RBC AUTO: 89 FL (ref 79–97)
MICRO URNS: ABNORMAL
MICRO URNS: ABNORMAL
MICROALBUMIN UR-MCNC: 12.4 UG/ML
MONOCYTES # BLD AUTO: 0.4 X10E3/UL (ref 0.1–0.9)
MONOCYTES NFR BLD AUTO: 10 %
NEUTROPHILS # BLD AUTO: 2.3 X10E3/UL (ref 1.4–7)
NEUTROPHILS NFR BLD AUTO: 52 %
NITRITE UR QL STRIP: NEGATIVE
PH UR STRIP: 5.5 [PH] (ref 5–7.5)
PLATELET # BLD AUTO: 244 X10E3/UL (ref 150–450)
POTASSIUM SERPL-SCNC: 4 MMOL/L (ref 3.5–5.2)
PROT SERPL-MCNC: 6.8 G/DL (ref 6–8.5)
PROT UR QL STRIP: NEGATIVE
RBC # BLD AUTO: 3.91 X10E6/UL (ref 3.77–5.28)
RBC #/AREA URNS HPF: ABNORMAL /HPF (ref 0–2)
SODIUM SERPL-SCNC: 143 MMOL/L (ref 134–144)
SP GR UR: >=1.03 (ref 1–1.03)
TRIGL SERPL-MCNC: 105 MG/DL (ref 0–149)
TSH SERPL DL<=0.005 MIU/L-ACNC: 1.79 UIU/ML (ref 0.45–4.5)
URINALYSIS REFLEX, 377202: ABNORMAL
UROBILINOGEN UR STRIP-MCNC: 0.2 MG/DL (ref 0.2–1)
VLDLC SERPL CALC-MCNC: 19 MG/DL (ref 5–40)
WBC # BLD AUTO: 4.4 X10E3/UL (ref 3.4–10.8)
WBC #/AREA URNS HPF: ABNORMAL /HPF (ref 0–5)

## 2021-03-15 NOTE — PROGRESS NOTES
RECOMMENDATIONS:  A1c is 6% - good control of diabetes.  Thyroid screen negative.  Liver and kidney function normal.  Blood counts normal (not anemic).  Cholesterol numbers look great!  Hepatitis C screening negative.

## 2021-04-07 ENCOUNTER — OFFICE VISIT (OUTPATIENT)
Dept: FAMILY MEDICINE CLINIC | Age: 56
End: 2021-04-07
Payer: MEDICAID

## 2021-04-07 VITALS
TEMPERATURE: 97.8 F | HEART RATE: 61 BPM | WEIGHT: 233.4 LBS | HEIGHT: 63 IN | DIASTOLIC BLOOD PRESSURE: 91 MMHG | SYSTOLIC BLOOD PRESSURE: 123 MMHG | RESPIRATION RATE: 12 BRPM | OXYGEN SATURATION: 100 % | BODY MASS INDEX: 41.36 KG/M2

## 2021-04-07 DIAGNOSIS — M25.561 CHRONIC PAIN OF RIGHT KNEE: Primary | ICD-10-CM

## 2021-04-07 DIAGNOSIS — H10.31 ACUTE CONJUNCTIVITIS OF RIGHT EYE, UNSPECIFIED ACUTE CONJUNCTIVITIS TYPE: ICD-10-CM

## 2021-04-07 DIAGNOSIS — I89.0 LYMPHEDEMA OF RIGHT LOWER EXTREMITY: ICD-10-CM

## 2021-04-07 DIAGNOSIS — G89.29 CHRONIC PAIN OF RIGHT KNEE: Primary | ICD-10-CM

## 2021-04-07 PROCEDURE — 99213 OFFICE O/P EST LOW 20 MIN: CPT | Performed by: NURSE PRACTITIONER

## 2021-04-07 RX ORDER — DICLOFENAC SODIUM 75 MG/1
75 TABLET, DELAYED RELEASE ORAL 2 TIMES DAILY
Qty: 40 TAB | Refills: 1 | Status: SHIPPED | OUTPATIENT
Start: 2021-04-07 | End: 2021-06-11

## 2021-04-07 RX ORDER — POLYMYXIN B SULFATE AND TRIMETHOPRIM 1; 10000 MG/ML; [USP'U]/ML
1 SOLUTION OPHTHALMIC EVERY 6 HOURS
Qty: 1 BOTTLE | Refills: 0 | Status: SHIPPED | OUTPATIENT
Start: 2021-04-07 | End: 2021-04-12

## 2021-04-07 RX ORDER — MINERAL OIL
180 ENEMA (ML) RECTAL
Qty: 90 TAB | Refills: 1 | Status: SHIPPED | OUTPATIENT
Start: 2021-04-07 | End: 2022-03-02

## 2021-04-07 RX ORDER — KETOTIFEN FUMARATE 0.35 MG/ML
1 SOLUTION/ DROPS OPHTHALMIC 2 TIMES DAILY
Qty: 10 ML | Refills: 0 | Status: SHIPPED | OUTPATIENT
Start: 2021-04-07 | End: 2021-10-01

## 2021-04-07 NOTE — PROGRESS NOTES
Teto Berrios (: 1965) is a 61 y.o. female, established patient, here for evaluation of the following chief complaint(s):  Leg Pain (Right), Eye Problem (runny ), and Eye Burn       ASSESSMENT/PLAN:  1. Chronic pain of right knee - xray in 2021 shows arthritis. Will order NSAID, has done PT, needs improvement in lymphedema. If no improvement in pain, will need to schedule with ortho  -     diclofenac EC (VOLTAREN) 75 mg EC tablet; Take 1 Tab by mouth two (2) times a day. As needed for knee pain, Normal, Disp-40 Tab, R-1  2. Lymphedema of right lower extremity - per lymphedema clinic  3. Acute conjunctivitis of right eye, unspecified acute conjunctivitis type ?bacterial since unilateral.  Will treat with abx eye drops. Patient to also start taking antihistamine and allergy eye drops  -     fexofenadine (ALLEGRA) 180 mg tablet; Take 1 Tab by mouth daily as needed for Allergies or Rhinitis., Normal, Disp-90 Tab, R-1  -     ketotifen (ZADITOR) 0.025 % (0.035 %) ophthalmic solution; Administer 1 Drop to both eyes two (2) times a day for 10 days. , Normal, Disp-10 mL, R-0 - start after taking Polytrim for 5 days. -     trimethoprim-polymyxin b (POLYTRIM) ophthalmic solution; Administer 1 Drop to right eye every six (6) hours for 5 days. , Normal, Disp-1 Bottle, R-0      Return if symptoms worsen or fail to improve. SUBJECTIVE/OBJECTIVE:  HPI  Patient comes in today for eye watering and right knee pain    Hx lymphedema in right leg. Was going to lymphedema clinic. Started out as a 15 now it is 12. Ordered compression machine on Friday. Wants her to use for at least 1 hour before bed. She is having right knee pain, xray in 2021 shows arthritis. Does not think lymphedema helps situation. She has used TENS unit as well. Interested in diclofenac. Right eye watering constantly for 2 weeks. Eyes is burning, feels irritated ans scratchy. No FB in eye. Does not wear contacts.   Some blurred vision. No recent eye exam.  When she wakes up in morning, eye is crusty. No pain with eye movement.   No Known Allergies    Past Medical History:   Diagnosis Date    Asthma     [facility] [address] 2019    Diabetes ([facility]     Family history of breast cancer     Family history of colon cancer     Hypertension        Past Surgical History:   Procedure Laterality Date    HX BREAST LUMPECTOMY Right 2019    HX GYN       x2    HX HYSTERECTOMY  2020    HX LYMPH NODE DISSECTION Right     HX ORTHOPAEDIC      bilateral foot surgery/reconstructive surgery, plates and pins in feet    [place] ABDOMEN SURGERY PROC UNLISTED      Cholecystectomy       Social History     Socioeconomic History    Marital status:      Spouse name: Not on file    Number of children: Not on file    Years of education: Not on file    Highest education level: Not on file   Occupational History    Not on file   Social Needs    Financial resource strain: Not on file    Food insecurity     Worry: Not on file     Inability: Not on file    Transportation needs     Medical: Not on file     Non-medical: Not on file   Tobacco Use    Smoking status: Never Smoker    Smokeless tobacco: Never Used   Substance and Sexual Activity    Alcohol use: No    Drug use: No    Sexual activity: Not Currently   Lifestyle    Physical activity     Days per week: Not on file     Minutes per session: Not on file    Stress: Not on file   Relationships    Social connections     Talks on phone: Not on file     Gets together: Not on file     Attends Anabaptist service: Not on file     Active member of club or organization: Not on file     Attends meetings of clubs or organizations: Not on file     Relationship status: Not on file    Intimate partner violence     Fear of current or ex partner: Not on file     Emotionally abused: Not on file     Physically abused: Not on file     Forced sexual activity: Not on file   Other Topics Concern    Not on file   Social History Narrative    Not on file       Family History   Problem Relation Age of Onset    Colon Cancer Father     Colon Cancer Brother     Breast Cancer Paternal Grandmother        Current Outpatient Medications   Medication Sig    losartan (COZAAR) 25 mg tablet Take 1 Tab by mouth daily.  letrozole (FEMARA) 2.5 mg tablet Take 1 Tab by mouth daily.  gabapentin (NEURONTIN) 300 mg capsule Take 1 Cap by mouth three (3) times daily.  VIT C-VIT D3-E-ZINC-ELDERBERRY PO Take 1 Tab by mouth daily.  loratadine (Claritin) 10 mg tablet Take 10 mg by mouth daily.  albuterol (PROVENTIL HFA, VENTOLIN HFA, PROAIR HFA) 90 mcg/actuation inhaler Take 2 Puffs by inhalation every four (4) hours as needed for Wheezing or Shortness of Breath.  metFORMIN (GLUCOPHAGE) 500 mg tablet Take 1 Tab by mouth three (3) times daily (with meals).  amLODIPine (NORVASC) 5 mg tablet Take 1 Tab by mouth daily. No current facility-administered medications for this visit. Review of Systems   Constitutional: Negative for chills and fever. Eyes: Positive for discharge, redness and itching. Negative for photophobia, pain and visual disturbance. Respiratory: Negative for shortness of breath. Cardiovascular: Positive for leg swelling (hx lymphedema right leg). Negative for chest pain and palpitations. Musculoskeletal: Positive for arthralgias (right knee pain). Allergic/Immunologic: Positive for environmental allergies. Vitals:    04/07/21 0939   BP: (!) 123/91   Pulse: 61   Resp: 12   Temp: 97.8 °F (36.6 °C)   TempSrc: Skin   SpO2: 100%   Weight: 233 lb 6.4 oz (105.9 kg)   Height: 5' 3\" (1.6 m)     Physical Exam  Vitals signs reviewed. Constitutional:       Appearance: Normal appearance. She is well-developed and well-groomed. She is obese. Eyes:      General: Lids are everted, no foreign bodies appreciated. Vision grossly intact. Gaze aligned appropriately.          Right eye: Discharge present. No foreign body. Left eye: No foreign body or discharge. Extraocular Movements: Extraocular movements intact. Conjunctiva/sclera:      Right eye: Right conjunctiva is injected. No exudate or hemorrhage. Left eye: Left conjunctiva is not injected. No exudate or hemorrhage. Cardiovascular:      Rate and Rhythm: Normal rate and regular rhythm. Heart sounds: Normal heart sounds. Pulmonary:      Effort: Pulmonary effort is normal.      Breath sounds: Normal breath sounds. Skin:     General: Skin is warm and dry. Neurological:      Mental Status: She is alert and oriented to person, place, and time. Psychiatric:         Behavior: Behavior is cooperative. On this date 04/07/2021 I have spent 24 minutes reviewing previous notes, test results and face to face with the patient discussing the diagnosis and importance of compliance with the treatment plan as well as documenting on the day of the visit. An electronic signature was used to authenticate this note.   -- Lieutenant Myrtle NP

## 2021-04-07 NOTE — PROGRESS NOTES
Chief Complaint   Patient presents with    Leg Pain     Right    Eye Problem     runny     Eye Burn       1. Have you been to the ER, urgent care clinic since your last visit? Hospitalized since your last visit? No    2. Have you seen or consulted any other health care providers outside of the [address] [place] [address] since your last visit? Include any pap smears or colon screening. Yes, PT for leg pain. PT states got Tens unit and was told to follow up with PCP for pain medication.      Mammo - Monica Bennett, Nov 2020  Colonoscopy - Scheduled in April   Health Maintenance Due   Topic Date Due    Pneumococcal 0-64 years (1 of 1 - PPSV23) Never done    Foot Exam Q1  Never done    Eye Exam Retinal or Dilated  Never done    DTaP/Tdap/Td series (1 - Tdap) Never done    Shingrix Vaccine Age 50> (1 of 2) Never done    Colorectal Cancer Screening Combo  Never done    Breast Cancer Screen Mammogram  09/30/2020

## 2021-06-11 DIAGNOSIS — M25.561 CHRONIC PAIN OF RIGHT KNEE: ICD-10-CM

## 2021-06-11 DIAGNOSIS — G89.29 CHRONIC PAIN OF RIGHT KNEE: ICD-10-CM

## 2021-06-11 RX ORDER — DICLOFENAC SODIUM 75 MG/1
TABLET, DELAYED RELEASE ORAL
Qty: 40 TABLET | Refills: 0 | Status: SHIPPED | OUTPATIENT
Start: 2021-06-11 | End: 2022-03-02 | Stop reason: SDUPTHER

## 2021-07-14 ENCOUNTER — OFFICE VISIT (OUTPATIENT)
Dept: FAMILY MEDICINE CLINIC | Age: 56
End: 2021-07-14
Payer: MEDICAID

## 2021-07-14 VITALS
BODY MASS INDEX: 41.78 KG/M2 | HEIGHT: 63 IN | TEMPERATURE: 98.2 F | HEART RATE: 77 BPM | RESPIRATION RATE: 12 BRPM | WEIGHT: 235.8 LBS | DIASTOLIC BLOOD PRESSURE: 75 MMHG | OXYGEN SATURATION: 100 % | SYSTOLIC BLOOD PRESSURE: 115 MMHG

## 2021-07-14 DIAGNOSIS — Z85.3 HISTORY OF BREAST CANCER: ICD-10-CM

## 2021-07-14 DIAGNOSIS — E11.9 CONTROLLED TYPE 2 DIABETES MELLITUS WITHOUT COMPLICATION, WITHOUT LONG-TERM CURRENT USE OF INSULIN (HCC): Primary | ICD-10-CM

## 2021-07-14 DIAGNOSIS — I10 ESSENTIAL HYPERTENSION WITH GOAL BLOOD PRESSURE LESS THAN 130/80: ICD-10-CM

## 2021-07-14 DIAGNOSIS — M25.561 CHRONIC PAIN OF RIGHT KNEE: ICD-10-CM

## 2021-07-14 DIAGNOSIS — I89.0 LYMPHEDEMA OF RIGHT LOWER EXTREMITY: ICD-10-CM

## 2021-07-14 DIAGNOSIS — E66.01 OBESITY, MORBID (HCC): ICD-10-CM

## 2021-07-14 DIAGNOSIS — G89.29 CHRONIC PAIN OF RIGHT KNEE: ICD-10-CM

## 2021-07-14 PROCEDURE — 99214 OFFICE O/P EST MOD 30 MIN: CPT | Performed by: NURSE PRACTITIONER

## 2021-07-14 RX ORDER — LIDOCAINE 50 MG/G
PATCH TOPICAL
Qty: 30 EACH | Refills: 5 | Status: SHIPPED | OUTPATIENT
Start: 2021-07-14 | End: 2022-03-02 | Stop reason: SDUPTHER

## 2021-07-14 NOTE — PROGRESS NOTES
Annelise Fletcher (: 1965) is a 61 y.o. female, established patient, here for evaluation of the following chief complaint(s):  Diabetes and Hypertension       ASSESSMENT/PLAN:  Below is the assessment and plan developed based on review of pertinent history, physical exam, labs, studies, and medications. 1. Controlled type 2 diabetes mellitus without complication, without long-term current use of insulin ([address] - last A1c 6% in 2021. Repeat A1c at next visit. 2. Essential hypertension with goal blood pressure less than 130/80 - stable. 3. Lymphedema of right lower extremity - continue with wraps, lymphedema machine  4. Chronic pain of right knee - can try lidocaine patches, if no improvement will need to see ortho  -     lidocaine (LIDODERM) 5 %; Apply patch to the affected area for 12 hours a day and remove for 12 hours a day., Normal, Disp-30 Each, R-5  5. History of breast cancer  6. Obesity, morbid ([address]      Return in about 6 months (around 2022), or if symptoms worsen or fail to improve. SUBJECTIVE/OBJECTIVE:  HPI  Patient comes in today for follow up. Resigned from job due to swelling in leg. Even getting on lymphedema machine still has some pain. Using diclofenac for pain with some relief. Thinks knee pain/arthritis contributes to some of these pains. Has numbness and tingling in fingertips, was told from chemo treatments. Oncology/Breast Surgeon - Dr. Werner Shea  Oncology - Dr. Brennan Linear   Dr. Genna Madden performed hysterectomy.    Saw [facility] for echo/cardiac workup for chemo     Hx HTN, diagnosed ~10 years ago. Taking amlodipine 5mg, losartan 25mg. Denies chest pains, SOB. Has some wheezing, but has hx of asthma. No cough, night sweats, unexplained weight loss     Hx diabetes since . Taking metformin 500mg TID with meals. Sometimes does not take 3 times daily. tolerating okay. No diarrhea.   FBG usually 80-90s     Hx right breast cancer. HER2 positve. Dx in 2019. Had lumpectomy and lymph node removal (negative bx) in 2019. Had chemo and radiation treatment. Last chemo treatment 2020 and last radiation treatment 2020. Had hysterectomy and BSO in Dec 2020. Had postop complications of wound infection. Had 6 different parasites. Family hx of breast cancer in [facility] and paternal cousins. Had mammogram done in 2020.     Had colonoscopy last month. 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   Brother dx with colon cancer at 22yo and father diagnosed with colon cancer age 37yo  No Known Allergies    Past Medical History:   Diagnosis Date    Asthma     [facility]    Diabetes [facility]     Family history of breast cancer     Family history of colon cancer     Hypertension        Past Surgical History:   Procedure Laterality Date    HX BREAST LUMPECTOMY Right 2019    HX GYN       x2    HX HYSTERECTOMY  2020    HX LYMPH NODE DISSECTION Right     HX ORTHOPAEDIC      bilateral foot surgery/reconstructive surgery, plates and pins in feet    CA ABDOMEN SURGERY PROC UNLISTED      Cholecystectomy       Social History     Socioeconomic History    Marital status:      Spouse name: Not on file    Number of children: Not on file    Years of education: Not on file    Highest education level: Not on file   Occupational History    Not on file   Tobacco Use    Smoking status: Never Smoker    Smokeless tobacco: Never Used   Vaping Use    Vaping Use: Never used   Substance and Sexual Activity    Alcohol use: No    Drug use: No    Sexual activity: Not Currently   Other Topics Concern    Not on file   Social History Narrative    Not on file     Social Determinants of Health     Financial Resource Strain:     Difficulty of Paying Living Expenses:    Food Insecurity:     Worried About Running Out of Food in the Last Year:     [address] in the Last Year: Transportation Needs:     Lack of Transportation (Medical):  Lack of Transportation (Non-Medical):    Physical Activity:     Days of Exercise per Week:     Minutes of Exercise per Session:    Stress:     Feeling of Stress :    Social Connections:     Frequency of Communication with Friends and Family:     Frequency of Social Gatherings with Friends and Family:     Attends Anabaptist Services:     Active Member of Clubs or Organizations:     Attends Club or Organization Meetings:     Marital Status:    Intimate Partner Violence:     Fear of Current or Ex-Partner:     Emotionally Abused:     Physically Abused:     Sexually Abused:        Family History   Problem Relation Age of Onset    Colon Cancer Father     Colon Cancer Brother     Breast Cancer Paternal Grandmother        Current Outpatient Medications   Medication Sig    diclofenac EC (VOLTAREN) 75 mg EC tablet TAKE 1 TABLET BY MOUTH TWICE DAILY AS NEEDED FOR  KNEE  PAIN    fexofenadine (ALLEGRA) 180 mg tablet Take 1 Tab by mouth daily as needed for Allergies or Rhinitis.  losartan (COZAAR) 25 mg tablet Take 1 Tab by mouth daily.  letrozole (FEMARA) 2.5 mg tablet Take 1 Tab by mouth daily.  gabapentin (NEURONTIN) 300 mg capsule Take 1 Cap by mouth three (3) times daily.  VIT C-VIT D3-E-ZINC-ELDERBERRY PO Take 1 Tab by mouth daily.  loratadine (Claritin) 10 mg tablet Take 10 mg by mouth daily.  albuterol (PROVENTIL HFA, VENTOLIN HFA, PROAIR HFA) 90 mcg/actuation inhaler Take 2 Puffs by inhalation every four (4) hours as needed for Wheezing or Shortness of Breath.  metFORMIN (GLUCOPHAGE) 500 mg tablet Take 1 Tab by mouth three (3) times daily (with meals).  amLODIPine (NORVASC) 5 mg tablet Take 1 Tab by mouth daily. No current facility-administered medications for this visit. Review of Systems   Constitutional: Negative for appetite change, chills, diaphoresis, fatigue, fever and unexpected weight change. Respiratory: Negative for cough, shortness of breath and wheezing. Cardiovascular: Positive for leg swelling. Negative for chest pain and palpitations. Gastrointestinal: Negative for abdominal pain, blood in stool, constipation, diarrhea, nausea and vomiting. Endocrine: Negative for cold intolerance and heat intolerance. Genitourinary: Negative for dysuria, flank pain, frequency, hematuria and urgency. Musculoskeletal: Positive for arthralgias (right knee pain, bilateral foot pain). Negative for back pain and myalgias. Skin: Negative. Neurological: Positive for numbness (fingertips). Negative for dizziness, speech difficulty, light-headedness and headaches. Psychiatric/Behavioral: Negative for dysphoric mood and sleep disturbance. The patient is not nervous/anxious. Visit Vitals  /75 (BP 1 Location: Left upper arm, BP Patient Position: Sitting, BP Cuff Size: Adult)   Pulse 77   Temp 98.2 °F (36.8 °C) (Oral)   Resp 12   Ht 5' 3\" (1.6 m)   Wt 235 lb 12.8 oz (107 kg)   SpO2 100%   BMI 41.77 kg/m²     Physical Exam  Vitals reviewed. Constitutional:       Appearance: Normal appearance. She is well-developed and well-groomed. HENT:      Right Ear: Hearing normal.      Left Ear: Hearing normal.   Neck:      Thyroid: No thyromegaly. Cardiovascular:      Rate and Rhythm: Normal rate and regular rhythm. Pulses:           Dorsalis pedis pulses are 2+ on the right side and 2+ on the left side. Heart sounds: Normal heart sounds, S1 normal and S2 normal.   Pulmonary:      Effort: Pulmonary effort is normal.      Breath sounds: Normal breath sounds. Musculoskeletal:      Right lower leg: Edema (due to lymphedema) present. Left lower leg: No edema. Lymphadenopathy:      Cervical: No cervical adenopathy. Skin:     General: Skin is warm and dry. Neurological:      Mental Status: She is alert and oriented to person, place, and time.    Psychiatric:         Attention and Perception: Attention normal.         Mood and Affect: Mood and affect normal.         Speech: Speech normal.         Behavior: Behavior normal. Behavior is cooperative. Thought Content: Thought content normal.         Cognition and Memory: Cognition and memory normal.       On this date 07/14/2021 I have spent 35 minutes reviewing previous notes, test results and face to face with the patient discussing the diagnosis and importance of compliance with the treatment plan as well as documenting on the day of the visit. An electronic signature was used to authenticate this note.   -- Salvador Heard NP

## 2021-07-14 NOTE — PROGRESS NOTES
Chief Complaint   Patient presents with    Diabetes    Hypertension       1. Have you been to the ER, urgent care clinic since your last visit? Hospitalized since your last visit? No    2. Have you seen or consulted any other health care providers outside of the [address] [place] [address] since your last visit? Include any pap smears or colon screening.  No     Colonoscopy - [facility], 06/2021  Eye Exam - Pt aware overdue  Mammogram - Dr. Ceci Larios Maintenance Due   Topic Date Due    Pneumococcal 0-64 years (1 of 2 - PPSV23) Never done    Foot Exam Q1  Never done    Eye Exam Retinal or Dilated  Never done    DTaP/Tdap/Td series (1 - Tdap) Never done    Colorectal Cancer Screening Combo  Never done    Shingrix Vaccine Age 50> (1 of 2) Never done    Breast Cancer Screen Mammogram  10/22/2020

## 2021-10-01 DIAGNOSIS — H10.31 ACUTE CONJUNCTIVITIS OF RIGHT EYE, UNSPECIFIED ACUTE CONJUNCTIVITIS TYPE: ICD-10-CM

## 2021-10-01 RX ORDER — KETOTIFEN FUMARATE 0.35 MG/ML
SOLUTION/ DROPS OPHTHALMIC
Qty: 10 ML | Refills: 0 | Status: SHIPPED | OUTPATIENT
Start: 2021-10-01

## 2021-10-01 RX ORDER — POLYMYXIN B SULFATE AND TRIMETHOPRIM 1; 10000 MG/ML; [USP'U]/ML
SOLUTION OPHTHALMIC
Qty: 10 ML | Refills: 0 | OUTPATIENT
Start: 2021-10-01

## 2021-10-14 ENCOUNTER — TELEPHONE (OUTPATIENT)
Dept: FAMILY MEDICINE CLINIC | Age: 56
End: 2021-10-14

## 2021-10-14 NOTE — TELEPHONE ENCOUNTER
Patient would like a call from [facility] regarding getting a machine for her asthma she can be reached @ [phone]

## 2021-10-18 ENCOUNTER — TELEPHONE (OUTPATIENT)
Dept: FAMILY MEDICINE CLINIC | Age: 56
End: 2021-10-18

## 2021-10-18 DIAGNOSIS — J45.20 MILD INTERMITTENT ASTHMA WITHOUT COMPLICATION: Primary | ICD-10-CM

## 2021-10-18 DIAGNOSIS — K04.7 DENTAL INFECTION: ICD-10-CM

## 2021-10-18 RX ORDER — AMOXICILLIN 500 MG/1
500 CAPSULE ORAL 3 TIMES DAILY
Qty: 21 CAPSULE | Refills: 0 | Status: SHIPPED | OUTPATIENT
Start: 2021-10-18 | End: 2021-10-25

## 2021-10-18 RX ORDER — ALBUTEROL SULFATE 0.83 MG/ML
2.5 SOLUTION RESPIRATORY (INHALATION)
Qty: 30 NEBULE | Refills: 5 | Status: SHIPPED | OUTPATIENT
Start: 2021-10-18

## 2021-10-18 NOTE — TELEPHONE ENCOUNTER
Verified patient with two type of identifiers. Informed pt of suggestions/instructions per Adriano Yoder NP. Pt verbalized understanding.

## 2021-10-18 NOTE — TELEPHONE ENCOUNTER
Verified patient with two type of identifiers. Pt inquiring about nebulizer. Pt to come  nebulizer and sign for her asthma flare. Pt states she thinks she has an infected tooth on the right side of her mouth. Pt had appt with dentist last Friday but they cancelled her and couldn't get her rescheduled until the first week of Nov. Pt is fearful now her mouth is hurting and she states that is the same side as her previous cancer.  Pt requesting an ABX until she can get in with dental.

## 2021-10-18 NOTE — TELEPHONE ENCOUNTER
Orders Placed This Encounter    albuterol (PROVENTIL VENTOLIN) 2.5 mg /3 mL (0.083 %) nebu     Sig: 3 mL by Nebulization route every four (4) hours as needed for Wheezing or Shortness of Breath. Indications: asthma attack     Dispense:  30 Nebule     Refill:  5    amoxicillin (AMOXIL) 500 mg capsule     Sig: Take 1 Capsule by mouth three (3) times daily for 7 days.      Dispense:  21 Capsule     Refill:  0

## 2021-12-10 ENCOUNTER — TELEPHONE (OUTPATIENT)
Dept: FAMILY MEDICINE CLINIC | Age: 56
End: 2021-12-10

## 2021-12-10 NOTE — TELEPHONE ENCOUNTER
Patient states that she need to have her tooth pull but she need a letter from [facility] she just finish chemo a year ago they need the ok she can be reached @ [phone]

## 2021-12-13 NOTE — TELEPHONE ENCOUNTER
Verified patient with two type of identifiers. Informed pt of suggestions/instructions per Dyllan Love NP. Pt verbalized understanding.

## 2021-12-13 NOTE — TELEPHONE ENCOUNTER
She can obtain from heme/onc.   I just established care with her in Feb 2021, I do not have documentation on her chemo treatments and when they ended,

## 2022-01-24 ENCOUNTER — TELEPHONE (OUTPATIENT)
Dept: FAMILY MEDICINE CLINIC | Age: 57
End: 2022-01-24

## 2022-01-24 NOTE — TELEPHONE ENCOUNTER
----- Message from Muriel Ward sent at 1/24/2022  3:49 PM EST -----  Subject: Appointment Request    Reason for Call: Routine Existing Condition Follow Up (Diabetes)    QUESTIONS  Type of Appointment? Established Patient  Reason for appointment request? Available appointments did not meet   patient need  Additional Information for Provider? PT is not interested in any video   visit, she needs in for a follow up screened green. PT WOULD LIKE A CALL   BACK TO RESCHEDULE :)   ---------------------------------------------------------------------------  --------------  CALL BACK INFO  What is the best way for the office to contact you? Do not leave any   message, patient will call back for answer  Preferred Call Back Phone Number? [phone]  ---------------------------------------------------------------------------  --------------  SCRIPT ANSWERS  Relationship to Patient? Self  Is this follow up request related to routine Diabetes Management? Yes  Have you been diagnosed with, awaiting test results for, or told that you   are suspected of having COVID-19 (Coronavirus)? (If patient has tested   negative or was tested as a requirement for work, school, or travel and   not based on symptoms, answer no)? No  Within the past two weeks have you developed any of the following symptoms   (answer no if symptoms have been present longer than 2 weeks or began   more than 2 weeks ago)? Fever or Chills, Cough, Shortness of breath or   difficulty breathing, Loss of taste or smell, Sore throat, Nasal   congestion, Sneezing or runny nose, Fatigue or generalized body aches   (answer no if pain is specific to a body part e.g. back pain), Diarrhea,   Headache? No  Have you had close contact with someone with COVID-19 in the last 14 days? No  (Service Expert  click yes below to proceed with [facility] As Usual   Scheduling)?  Yes

## 2022-01-26 ENCOUNTER — TELEPHONE (OUTPATIENT)
Dept: FAMILY MEDICINE CLINIC | Age: 57
End: 2022-01-26

## 2022-01-26 DIAGNOSIS — L81.9 DISCOLORATION OF SKIN OF TOE: ICD-10-CM

## 2022-01-26 DIAGNOSIS — E11.9 CONTROLLED TYPE 2 DIABETES MELLITUS WITHOUT COMPLICATION, WITHOUT LONG-TERM CURRENT USE OF INSULIN (HCC): Primary | ICD-10-CM

## 2022-01-26 NOTE — TELEPHONE ENCOUNTER
----- Message from Mary Lopez sent at 1/26/2022  9:00 AM EST -----  Subject: Message to Provider    QUESTIONS  Information for Provider? PATIENT RETURNING PHONE CALL FROM NURSE SUSHMA   SHE RECEIVED 1/25/2022 IN REGARDS OF OFFICE VISIT, PATIENT ALSO STATED   SHE'S UNABLE TO DO VV VISIT  ---------------------------------------------------------------------------  --------------  CALL BACK INFO  What is the best way for the office to contact you? OK to leave message on   voicemail  Preferred Call Back Phone Number?  [phone]  ---------------------------------------------------------------------------  --------------  SCRIPT ANSWERS  undefined

## 2022-01-31 NOTE — TELEPHONE ENCOUNTER
Verified patient with two type of identifiers. Pt confirmed appt scheduled 3/2/22. Pt concerned abut discoloration of toes for the past 2-3 months. Per Ly Ortiz NP pt to schedule with podiatry ASAP. Pt provided with Contact information to [facility], [phone]. Pt verbalized understanding.

## 2022-03-02 ENCOUNTER — OFFICE VISIT (OUTPATIENT)
Dept: FAMILY MEDICINE CLINIC | Age: 57
End: 2022-03-02
Payer: MEDICAID

## 2022-03-02 VITALS
WEIGHT: 234 LBS | OXYGEN SATURATION: 100 % | DIASTOLIC BLOOD PRESSURE: 84 MMHG | BODY MASS INDEX: 43.06 KG/M2 | HEART RATE: 66 BPM | TEMPERATURE: 98 F | RESPIRATION RATE: 16 BRPM | HEIGHT: 62 IN | SYSTOLIC BLOOD PRESSURE: 135 MMHG

## 2022-03-02 DIAGNOSIS — I10 ESSENTIAL HYPERTENSION WITH GOAL BLOOD PRESSURE LESS THAN 130/80: ICD-10-CM

## 2022-03-02 DIAGNOSIS — G89.29 CHRONIC PAIN OF RIGHT KNEE: ICD-10-CM

## 2022-03-02 DIAGNOSIS — M25.561 CHRONIC PAIN OF RIGHT KNEE: ICD-10-CM

## 2022-03-02 DIAGNOSIS — E11.9 CONTROLLED TYPE 2 DIABETES MELLITUS WITHOUT COMPLICATION, WITHOUT LONG-TERM CURRENT USE OF INSULIN (HCC): Primary | ICD-10-CM

## 2022-03-02 DIAGNOSIS — E66.01 OBESITY, MORBID (HCC): ICD-10-CM

## 2022-03-02 DIAGNOSIS — I89.0 LYMPHEDEMA OF RIGHT LOWER EXTREMITY: ICD-10-CM

## 2022-03-02 PROCEDURE — 99214 OFFICE O/P EST MOD 30 MIN: CPT | Performed by: NURSE PRACTITIONER

## 2022-03-02 RX ORDER — DICLOFENAC SODIUM 75 MG/1
TABLET, DELAYED RELEASE ORAL
Qty: 60 TABLET | Refills: 5 | Status: SHIPPED | OUTPATIENT
Start: 2022-03-02

## 2022-03-02 RX ORDER — LORATADINE 10 MG/1
10 TABLET ORAL DAILY
Qty: 90 TABLET | Refills: 3 | Status: SHIPPED | OUTPATIENT
Start: 2022-03-02

## 2022-03-02 RX ORDER — LIDOCAINE 50 MG/G
PATCH TOPICAL
Qty: 30 EACH | Refills: 5 | Status: SHIPPED | OUTPATIENT
Start: 2022-03-02

## 2022-03-02 RX ORDER — AMLODIPINE BESYLATE 5 MG/1
5 TABLET ORAL DAILY
Qty: 90 TABLET | Refills: 3 | Status: SHIPPED | OUTPATIENT
Start: 2022-03-02

## 2022-03-02 RX ORDER — METFORMIN HYDROCHLORIDE 500 MG/1
500 TABLET ORAL
Qty: 270 TABLET | Refills: 3 | Status: SHIPPED | OUTPATIENT
Start: 2022-03-02

## 2022-03-02 NOTE — PROGRESS NOTES
Geni Ruby (: 1965) is a 61 y.o. female, established patient, here for evaluation of the following chief complaint(s):  Diabetes       ASSESSMENT/PLAN:  Below is the assessment and plan developed based on review of pertinent history, physical exam, labs, studies, and medications. 1. Controlled type 2 diabetes mellitus without complication, without long-term current use of insulin (H[facility])  -     metFORMIN (GLUCOPHAGE) 500 mg tablet; Take 1 Tablet by mouth three (3) times daily (with meals). , Normal, Disp-270 Tablet, R-3  2. Essential hypertension with goal blood pressure less than 130/80  -     amLODIPine (NORVASC) 5 mg tablet; Take 1 Tablet by mouth daily. , Normal, Disp-90 Tablet, R-3  3. Obesity, morbid ([address]  4. Lymphedema of right lower extremity  -     REFERRAL TO VASCULAR SURGERY  5. Chronic pain of right knee  -     lidocaine (LIDODERM) 5 %; Apply patch to the affected area for 12 hours a day and remove for 12 hours a day., Normal, Disp-30 Each, R-5  -     diclofenac EC (VOLTAREN) 75 mg EC tablet; TAKE 1 TABLET BY MOUTH TWICE DAILY AS NEEDED FOR  KNEE  PAIN, Normal, Disp-60 Tablet, R-5      Return in about 6 months (around 2022). SUBJECTIVE/OBJECTIVE:  HPI   Patient comes in today for HTN, lymphedema  Mother recently had heart attack. She is back in therapy for lymphedema. Has pain every day. Lymph nodes are expanding but still holding fluid. Looking into water aerobics 3 times weekly. [facility] told her she may need full disability. Resigned from job due to swelling in leg.     Even getting on lymphedema machine still has some pain.     Oncology/Breast Surgeon - Dr. Evans Meals  Oncology - Dr. Nehemias Alatorre  Radiology Oncology - Mariluz Her hysterectomy.    Saw S for echo/cardiac workup for chemo     Hx HTN, diagnosed ~10 years ago. Oralee Senters amlodipine 5mg, losartan 25mg.  Denies chest pains, SOB.  Has some wheezing, but has hx of asthma.  No cough, night sweats, unexplained weight loss     Hx diabetes since .  Taking metformin 500mg TID with meals.  Sometimes does not take 3 times daily.  tolerating okay.  No diarrhea.  FBG usually 80-90s     Hx right breast cancer. Patel Certain positve.  Dx in 2019.  Had lumpectomy and lymph node removal (negative bx) in 2019.   Had chemo and radiation treatment.  Last chemo treatment 2020 and last radiation treatment 2020.  Had hysterectomy and BSO in Dec 2020.  Had postop complications of wound infection.  Had 6 different parasites. Family hx of breast cancer in [facility] and paternal cousins. Had mammogram done in 2020.     Had colonoscopy last year.   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  Brother dx with colon cancer at 22yo and father diagnosed with colon cancer age 39yo  No Known Allergies    Past Medical History:   Diagnosis Date    Asthma     [facility] ([facility] 2019    Diabetes ([facility]     Family history of breast cancer     Family history of colon cancer     Hypertension        Past Surgical History:   Procedure Laterality Date    HX BREAST LUMPECTOMY Right 2019    HX GYN       x2    HX HYSTERECTOMY  2020    HX LYMPH NODE DISSECTION Right     HX ORTHOPAEDIC      bilateral foot surgery/reconstructive surgery, plates and pins in feet    [place] ABDOMEN SURGERY PROC UNLISTED      Cholecystectomy       Social History     Socioeconomic History    Marital status:      Spouse name: Not on file    Number of children: Not on file    Years of education: Not on file    Highest education level: Not on file   Occupational History    Not on file   Tobacco Use    Smoking status: Never Smoker    Smokeless tobacco: Never Used   Vaping Use    Vaping Use: Never used   Substance and Sexual Activity    Alcohol use: No    Drug use: No    Sexual activity: Not Currently   Other Topics Concern    Not on file   Social History Narrative    Not on file Social Determinants of Health     Financial Resource Strain:     Difficulty of Paying Living Expenses: Not on file   Food Insecurity:     Worried About Running Out of Food in the Last Year: Not on file    Jennifer of Food in the Last Year: Not on file   Transportation Needs:     Lack of Transportation (Medical): Not on file    Lack of Transportation (Non-Medical): Not on file   Physical Activity:     Days of Exercise per Week: Not on file    Minutes of Exercise per Session: Not on file   Stress:     Feeling of Stress : Not on file   Social Connections:     Frequency of Communication with Friends and Family: Not on file    Frequency of Social Gatherings with Friends and Family: Not on file    Attends Alevism Services: Not on file    Active Member of [address] [place] [address] [employer] or Organizations: Not on file    Attends Club or Organization Meetings: Not on file    Marital Status: Not on file   Intimate Partner Violence:     Fear of Current or Ex-Partner: Not on file    Emotionally Abused: Not on file    Physically Abused: Not on file    Sexually Abused: Not on file   Housing Stability:     Unable to Pay for Housing in the Last Year: Not on file    Number of [place] in the Last Year: Not on file    Unstable Housing in the Last Year: Not on file       Family History   Problem Relation Age of Onset    Colon Cancer Father     Colon Cancer Brother     Breast Cancer Paternal Grandmother        Current Outpatient Medications   Medication Sig    loratadine (Claritin) 10 mg tablet Take 1 Tablet by mouth daily.  metFORMIN (GLUCOPHAGE) 500 mg tablet Take 1 Tablet by mouth three (3) times daily (with meals).  lidocaine (LIDODERM) 5 % Apply patch to the affected area for 12 hours a day and remove for 12 hours a day.  amLODIPine (NORVASC) 5 mg tablet Take 1 Tablet by mouth daily.     diclofenac EC (VOLTAREN) 75 mg EC tablet TAKE 1 TABLET BY MOUTH TWICE DAILY AS NEEDED FOR  KNEE  PAIN    albuterol (PROVENTIL VENTOLIN) 2.5 mg /3 mL (0.083 %) nebu 3 mL by Nebulization route every four (4) hours as needed for Wheezing or Shortness of Breath. Indications: asthma attack    ketotifen (ZADITOR) 0.025 % (0.035 %) ophthalmic solution INSTILL 1 DROP INTO EACH EYE TWICE DAILY FOR 10 DAYS    losartan (COZAAR) 25 mg tablet Take 1 Tab by mouth daily.  letrozole (FEMARA) 2.5 mg tablet Take 1 Tab by mouth daily.  gabapentin (NEURONTIN) 300 mg capsule Take 1 Cap by mouth three (3) times daily.  VIT C-VIT D3-E-ZINC-ELDERBERRY PO Take 1 Tab by mouth daily.  albuterol (PROVENTIL HFA, VENTOLIN HFA, PROAIR HFA) 90 mcg/actuation inhaler Take 2 Puffs by inhalation every four (4) hours as needed for Wheezing or Shortness of Breath. No current facility-administered medications for this visit. Review of Systems   Constitutional: Negative for appetite change, chills, diaphoresis, fatigue, fever and unexpected weight change. Respiratory: Negative for cough, shortness of breath and wheezing. Cardiovascular: Positive for leg swelling (right leg). Negative for chest pain and palpitations. Gastrointestinal: Negative for abdominal pain, blood in stool, constipation, diarrhea, nausea and vomiting. Endocrine: Negative for cold intolerance and heat intolerance. Genitourinary: Negative for dysuria, flank pain, frequency, hematuria and urgency. Musculoskeletal: Positive for arthralgias (right knee pain, bilateral foot pain). Negative for back pain and myalgias. Skin: Negative. Neurological: Positive for numbness (fingertips). Negative for dizziness, speech difficulty, light-headedness and headaches. Psychiatric/Behavioral: Negative for dysphoric mood and sleep disturbance. The patient is not nervous/anxious. Physical Exam  Vitals reviewed. Constitutional:       Appearance: Normal appearance. She is well-developed and well-groomed.    HENT:      Right Ear: Hearing normal.      Left Ear: Hearing normal.   Neck: Thyroid: No thyromegaly. Cardiovascular:      Rate and Rhythm: Normal rate and regular rhythm. Pulses:           Dorsalis pedis pulses are 2+ on the right side and 2+ on the left side. Heart sounds: Normal heart sounds, S1 normal and S2 normal.   Pulmonary:      Effort: Pulmonary effort is normal.      Breath sounds: Normal breath sounds. Musculoskeletal:      Right lower leg: Edema (due to lymphedema) present. Left lower leg: No edema. Lymphadenopathy:      Cervical: No cervical adenopathy. Skin:     General: Skin is warm and dry. Neurological:      Mental Status: She is alert and oriented to person, place, and time. Psychiatric:         Attention and Perception: Attention normal.         Mood and Affect: Mood and affect normal.         Speech: Speech normal.         Behavior: Behavior normal. Behavior is cooperative. Thought Content: Thought content normal.         Cognition and Memory: Cognition and memory normal.         On this date 03/02/2022 I have spent 30 minutes reviewing previous notes, test results and face to face with the patient discussing the diagnosis and importance of compliance with the treatment plan as well as documenting on the day of the visit. An electronic signature was used to authenticate this note.   -- Kamaljit Uribe NP

## 2022-03-02 NOTE — PROGRESS NOTES
Chief Complaint   Patient presents with    Diabetes       1. \"Have you been to the ER, urgent care clinic since your last visit? Hospitalized since your last visit? \" No    2. \"Have you seen or consulted any other health care providers outside of the [address] [place] [address] since your last visit? \" No     3. For patients aged 39-70: Has the patient had a colonoscopy / FIT/ Cologuard? Yes - Care Gap present. Most recent result on file      If the patient is female:    4. For patients aged 41-77: Has the patient had a mammogram within the past 2 years? Yes - Care Gap present. Most recent result on file      5. For patients aged 21-65: Has the patient had a pap smear?  No    Health Maintenance Due   Topic Date Due    Foot Exam Q1  Never done    Eye Exam Retinal or Dilated  Never done    Colorectal Cancer Screening Combo  Never done    Breast Cancer Screen Mammogram  10/22/2020    COVID-19 Vaccine (3 - Booster for Pfizer series) 07/25/2021    Flu Vaccine (1) Never done    Depression Screen  02/26/2022    A1C test (Diabetic or Prediabetic)  03/12/2022    MICROALBUMIN Q1  03/12/2022    Lipid Screen  03/12/2022   Patient would like to discuss her lymphedema in her right leg

## 2022-03-07 ENCOUNTER — CLINICAL SUPPORT (OUTPATIENT)
Dept: FAMILY MEDICINE CLINIC | Age: 57
End: 2022-03-07

## 2022-03-07 DIAGNOSIS — I10 ESSENTIAL HYPERTENSION WITH GOAL BLOOD PRESSURE LESS THAN 130/80: ICD-10-CM

## 2022-03-07 DIAGNOSIS — E11.9 CONTROLLED TYPE 2 DIABETES MELLITUS WITHOUT COMPLICATION, WITHOUT LONG-TERM CURRENT USE OF INSULIN (HCC): Primary | ICD-10-CM

## 2022-03-07 NOTE — PROGRESS NOTES
Sharon Denny (: 1965) is a 61 y.o. female, established patient, here for evaluation of the following chief complaint(s):  Labs Only       ASSESSMENT/PLAN:  Below is the assessment and plan developed based on review of pertinent history, physical exam, labs, studies, and medications. 1. Controlled type 2 diabetes mellitus without complication, without long-term current use of insulin (HCC)  -     CBC W/O DIFF; Future  -     METABOLIC PANEL, COMPREHENSIVE; Future  -     HEMOGLOBIN A1C WITH EAG; Future  -     LIPID PANEL; Future  -     MICROALBUMIN, UR, RAND W/ MICROALB/CREAT RATIO; Future  -     URINALYSIS W/ REFLEX CULTURE; Future  2. Essential hypertension with goal blood pressure less than 130/80  -     CBC W/O DIFF; Future  -     METABOLIC PANEL, COMPREHENSIVE; Future  -     LIPID PANEL; Future  -     MICROALBUMIN, UR, RAND W/ MICROALB/CREAT RATIO; Future  -     URINALYSIS W/ REFLEX CULTURE; Future    An electronic signature was used to authenticate this note.   -- Marcia Almeida NP

## 2022-03-10 LAB
ALBUMIN SERPL-MCNC: 3.9 G/DL (ref 3.8–4.9)
ALBUMIN/CREAT UR: 9 MG/G CREAT (ref 0–29)
ALBUMIN/GLOB SERPL: 1.7 {RATIO} (ref 1.2–2.2)
ALP SERPL-CCNC: 64 IU/L (ref 44–121)
ALT SERPL-CCNC: 13 IU/L (ref 0–32)
APPEARANCE UR: CLEAR
AST SERPL-CCNC: 17 IU/L (ref 0–40)
BACTERIA #/AREA URNS HPF: ABNORMAL /[HPF]
BACTERIA UR CULT: ABNORMAL
BILIRUB SERPL-MCNC: 0.2 MG/DL (ref 0–1.2)
BILIRUB UR QL STRIP: NEGATIVE
BUN SERPL-MCNC: 11 MG/DL (ref 6–24)
BUN/CREAT SERPL: 17 (ref 9–23)
CALCIUM SERPL-MCNC: 8.5 MG/DL (ref 8.7–10.2)
CASTS URNS QL MICRO: ABNORMAL /LPF
CHLORIDE SERPL-SCNC: 107 MMOL/L (ref 96–106)
CHOLEST SERPL-MCNC: 122 MG/DL (ref 100–199)
CO2 SERPL-SCNC: 20 MMOL/L (ref 20–29)
COLOR UR: YELLOW
CREAT SERPL-MCNC: 0.65 MG/DL (ref 0.57–1)
CREAT UR-MCNC: 139.7 MG/DL
EGFR: 102 ML/MIN/1.73
EPI CELLS #/AREA URNS HPF: ABNORMAL /HPF (ref 0–10)
ERYTHROCYTE [DISTWIDTH] IN BLOOD BY AUTOMATED COUNT: 13.2 % (ref 11.7–15.4)
EST. AVERAGE GLUCOSE BLD GHB EST-MCNC: 123 MG/DL
GLOBULIN SER CALC-MCNC: 2.3 G/DL (ref 1.5–4.5)
GLUCOSE SERPL-MCNC: 94 MG/DL (ref 65–99)
GLUCOSE UR QL STRIP: NEGATIVE
HBA1C MFR BLD: 5.9 % (ref 4.8–5.6)
HCT VFR BLD AUTO: 32.4 % (ref 34–46.6)
HDLC SERPL-MCNC: 46 MG/DL
HGB BLD-MCNC: 10.2 G/DL (ref 11.1–15.9)
HGB UR QL STRIP: NEGATIVE
IMP & REVIEW OF LAB RESULTS: NORMAL
INTERPRETATION: NORMAL
KETONES UR QL STRIP: NEGATIVE
LDLC SERPL CALC-MCNC: 65 MG/DL (ref 0–99)
LEUKOCYTE ESTERASE UR QL STRIP: NEGATIVE
MCH RBC QN AUTO: 29.6 PG (ref 26.6–33)
MCHC RBC AUTO-ENTMCNC: 31.5 G/DL (ref 31.5–35.7)
MCV RBC AUTO: 94 FL (ref 79–97)
MICRO URNS: NORMAL
MICRO URNS: NORMAL
MICROALBUMIN UR-MCNC: 11.9 UG/ML
NITRITE UR QL STRIP: NEGATIVE
PH UR STRIP: 6 [PH] (ref 5–7.5)
PLATELET # BLD AUTO: 210 X10E3/UL (ref 150–450)
POTASSIUM SERPL-SCNC: 3.8 MMOL/L (ref 3.5–5.2)
PROT SERPL-MCNC: 6.2 G/DL (ref 6–8.5)
PROT UR QL STRIP: NEGATIVE
RBC # BLD AUTO: 3.45 X10E6/UL (ref 3.77–5.28)
RBC #/AREA URNS HPF: ABNORMAL /HPF (ref 0–2)
SODIUM SERPL-SCNC: 143 MMOL/L (ref 134–144)
SP GR UR STRIP: 1.02 (ref 1–1.03)
TRIGL SERPL-MCNC: 48 MG/DL (ref 0–149)
URINALYSIS REFLEX, 377202: NORMAL
UROBILINOGEN UR STRIP-MCNC: 0.2 MG/DL (ref 0.2–1)
VLDLC SERPL CALC-MCNC: 11 MG/DL (ref 5–40)
WBC # BLD AUTO: 4.8 X10E3/UL (ref 3.4–10.8)
WBC #/AREA URNS HPF: ABNORMAL /HPF (ref 0–5)

## 2022-03-14 NOTE — PROGRESS NOTES
Hemoglobin has dropped a little, indicating anemia. This can be caused by not enough iron in the diet, or bleeding inside the intestinal tract (from ulcers, colon polyps , colon cancer, hemorrhoids , or other conditions). I would like to check your iron levels. You can call the office and schedule a lab only visit within the next 3-4 weeks. A1c is 5.9% - EXCELLENT control of diabetes. Urine culture positive for urinary tract infection. Will send an antibiotic called Macrobid to pharmacy. Liver and kidney function normal.  Cholesterol numbers look great!

## 2022-03-17 ENCOUNTER — TELEPHONE (OUTPATIENT)
Dept: FAMILY MEDICINE CLINIC | Age: 57
End: 2022-03-17

## 2022-03-17 DIAGNOSIS — N39.0 URINARY TRACT INFECTION WITHOUT HEMATURIA, SITE UNSPECIFIED: Primary | ICD-10-CM

## 2022-03-17 DIAGNOSIS — E11.9 CONTROLLED TYPE 2 DIABETES MELLITUS WITHOUT COMPLICATION, WITHOUT LONG-TERM CURRENT USE OF INSULIN (HCC): ICD-10-CM

## 2022-03-17 DIAGNOSIS — I10 ESSENTIAL HYPERTENSION WITH GOAL BLOOD PRESSURE LESS THAN 130/80: ICD-10-CM

## 2022-03-17 PROCEDURE — 3044F HG A1C LEVEL LT 7.0%: CPT | Performed by: NURSE PRACTITIONER

## 2022-03-17 RX ORDER — NITROFURANTOIN 25; 75 MG/1; MG/1
100 CAPSULE ORAL 2 TIMES DAILY
Qty: 14 CAPSULE | Refills: 0 | Status: SHIPPED | OUTPATIENT
Start: 2022-03-17 | End: 2022-03-24

## 2022-03-17 RX ORDER — LOSARTAN POTASSIUM 25 MG/1
TABLET ORAL
Qty: 90 TABLET | Refills: 3 | Status: SHIPPED | OUTPATIENT
Start: 2022-03-17

## 2022-03-17 NOTE — TELEPHONE ENCOUNTER
Orders Placed This Encounter    nitrofurantoin, macrocrystal-monohydrate, (MACROBID) 100 mg capsule     Sig: Take 1 Capsule by mouth two (2) times a day for 7 days.      Dispense:  14 Capsule     Refill:  0

## 2022-03-17 NOTE — TELEPHONE ENCOUNTER
Patient is returning a call to the office. Please call @[phone]. Leave a detailed message if necessary. She also is calling about a referral for lymphedema.

## 2022-03-17 NOTE — TELEPHONE ENCOUNTER
Verified patient with two type of identifiers. Informed pt of lab results and/or prescription(s). Pt verbalized understanding. Pt scheduled for lab only.

## 2022-03-17 NOTE — TELEPHONE ENCOUNTER
----- Message from Fara Mcclellan NP sent at 3/14/2022  6:49 PM EDT -----  Hemoglobin has dropped a little, indicating anemia. This can be caused by not enough iron in the diet, or bleeding inside the intestinal tract (from ulcers, colon polyps , colon cancer, hemorrhoids , or other conditions). I would like to check your iron levels. You can call the office and schedule a lab only visit within the next 3-4 weeks. A1c is 5.9% - EXCELLENT control of diabetes. Urine culture positive for urinary tract infection. Will send an antibiotic called Macrobid to pharmacy. Liver and kidney function normal.  Cholesterol numbers look great!

## 2022-03-18 PROBLEM — C50.919 BREAST CANCER (HCC): Status: ACTIVE | Noted: 2019-01-01

## 2022-03-19 PROBLEM — E66.01 OBESITY, MORBID (HCC): Status: ACTIVE | Noted: 2021-02-26

## 2022-03-19 PROBLEM — E11.9 CONTROLLED TYPE 2 DIABETES MELLITUS WITHOUT COMPLICATION, WITHOUT LONG-TERM CURRENT USE OF INSULIN (HCC): Status: ACTIVE | Noted: 2021-02-26

## 2022-03-20 PROBLEM — J45.20 MILD INTERMITTENT ASTHMA WITHOUT COMPLICATION: Status: ACTIVE | Noted: 2021-02-26

## 2022-03-20 PROBLEM — I10 ESSENTIAL HYPERTENSION WITH GOAL BLOOD PRESSURE LESS THAN 130/80: Status: ACTIVE | Noted: 2021-02-26

## 2022-03-25 ENCOUNTER — CLINICAL SUPPORT (OUTPATIENT)
Dept: FAMILY MEDICINE CLINIC | Age: 57
End: 2022-03-25

## 2022-03-25 DIAGNOSIS — D64.9 LOW HEMOGLOBIN: Primary | ICD-10-CM

## 2022-03-25 NOTE — PROGRESS NOTES
Loulou Woodward (: 1965) is a 61 y.o. female, established patient, here for evaluation of the following chief complaint(s):  Labs Only       ASSESSMENT/PLAN:  Below is the assessment and plan developed based on review of pertinent history, physical exam, labs, studies, and medications. 1. Low hemoglobin  -     CBC WITH AUTOMATED DIFF; Future  -     IRON PROFILE; Future  -     FERRITIN; Future      An electronic signature was used to authenticate this note.   -- Deonte Camp, NP

## 2022-03-26 LAB
BASOPHILS # BLD AUTO: 0.1 X10E3/UL (ref 0–0.2)
BASOPHILS NFR BLD AUTO: 1 %
EOSINOPHIL # BLD AUTO: 0.2 X10E3/UL (ref 0–0.4)
EOSINOPHIL NFR BLD AUTO: 5 %
ERYTHROCYTE [DISTWIDTH] IN BLOOD BY AUTOMATED COUNT: 13.2 % (ref 11.7–15.4)
FERRITIN SERPL-MCNC: 54 NG/ML (ref 15–150)
HCT VFR BLD AUTO: 31.8 % (ref 34–46.6)
HGB BLD-MCNC: 10.6 G/DL (ref 11.1–15.9)
IMM GRANULOCYTES # BLD AUTO: 0 X10E3/UL (ref 0–0.1)
IMM GRANULOCYTES NFR BLD AUTO: 0 %
IRON SATN MFR SERPL: 22 % (ref 15–55)
IRON SERPL-MCNC: 67 UG/DL (ref 27–159)
LYMPHOCYTES # BLD AUTO: 2 X10E3/UL (ref 0.7–3.1)
LYMPHOCYTES NFR BLD AUTO: 38 %
MCH RBC QN AUTO: 30.1 PG (ref 26.6–33)
MCHC RBC AUTO-ENTMCNC: 33.3 G/DL (ref 31.5–35.7)
MCV RBC AUTO: 90 FL (ref 79–97)
MONOCYTES # BLD AUTO: 0.5 X10E3/UL (ref 0.1–0.9)
MONOCYTES NFR BLD AUTO: 10 %
NEUTROPHILS # BLD AUTO: 2.4 X10E3/UL (ref 1.4–7)
NEUTROPHILS NFR BLD AUTO: 46 %
PLATELET # BLD AUTO: 234 X10E3/UL (ref 150–450)
RBC # BLD AUTO: 3.52 X10E6/UL (ref 3.77–5.28)
TIBC SERPL-MCNC: 306 UG/DL (ref 250–450)
UIBC SERPL-MCNC: 239 UG/DL (ref 131–425)
WBC # BLD AUTO: 5.1 X10E3/UL (ref 3.4–10.8)

## 2022-03-28 NOTE — PROGRESS NOTES
Kristin - see if we can get colonoscopy report from lat year    Hemoglobin is still little low at 10.6, iron profile is on low end of normal.  You can take a multivitamin with iron daily. Foods rich in iron include:  Red meat, pork and poultry   Seafood   Beans   Dark green leafy vegetables, such as spinach   Dried fruit, such as raisins and apricots   Iron-fortified cereals, breads and pastas   Peas  Your body absorbs more iron from meat than it does from other sources. If you choose to not eat meat, you may need to increase your intake of iron-rich, plant-based foods to absorb the same amount of iron as does someone who eats meat.

## 2022-03-29 ENCOUNTER — TELEPHONE (OUTPATIENT)
Dept: FAMILY MEDICINE CLINIC | Age: 57
End: 2022-03-29

## 2022-06-07 ENCOUNTER — TELEPHONE (OUTPATIENT)
Dept: FAMILY MEDICINE CLINIC | Age: 57
End: 2022-06-07

## 2022-06-07 DIAGNOSIS — R39.9 UTI SYMPTOMS: Primary | ICD-10-CM

## 2022-06-07 RX ORDER — NITROFURANTOIN 25; 75 MG/1; MG/1
100 CAPSULE ORAL 2 TIMES DAILY
Qty: 14 CAPSULE | Refills: 0 | Status: SHIPPED | OUTPATIENT
Start: 2022-06-07 | End: 2022-06-14

## 2022-06-07 NOTE — TELEPHONE ENCOUNTER
Sent prescription or Macrobid 100mg BID x7d. If no improvement in 3-4 days, would need office visit. Orders Placed This Encounter    nitrofurantoin, macrocrystal-monohydrate, (MACROBID) 100 mg capsule     Sig: Take 1 Capsule by mouth two (2) times a day for 7 days. Dispense:  14 Capsule     Refill:  0     · Drink extra water and other fluids for the next day or two. This may help wash out the bacteria that are causing the infection. · Avoid common bladder irritants, such as caffeine, carbonated beverages, citrus fruits or juices, and chocolate. · Urinate often. Try to empty your bladder each time  · Adding a probiotic, such as DanActiv yogurt drink, Florastor or Culturelle, twice daily while on the antibiotic (1-2 hours before or after antibiotic doses) and for 1 week after may help to prevent antibiotic-associated diarrhea.         Please seek more urgent medical attention if you develop any of the following:  · High fever  · Vomiting  · Bloody urine  · Severe pain in your back, side or belly

## 2022-09-02 ENCOUNTER — OFFICE VISIT (OUTPATIENT)
Dept: FAMILY MEDICINE CLINIC | Age: 57
End: 2022-09-02
Payer: MEDICAID

## 2022-09-02 VITALS
HEART RATE: 63 BPM | OXYGEN SATURATION: 98 % | HEIGHT: 63 IN | BODY MASS INDEX: 39.48 KG/M2 | RESPIRATION RATE: 17 BRPM | TEMPERATURE: 98.2 F | DIASTOLIC BLOOD PRESSURE: 89 MMHG | SYSTOLIC BLOOD PRESSURE: 116 MMHG | WEIGHT: 222.8 LBS

## 2022-09-02 DIAGNOSIS — I89.0 LYMPHEDEMA OF RIGHT LOWER EXTREMITY: ICD-10-CM

## 2022-09-02 DIAGNOSIS — B35.1 ONYCHOMYCOSIS: ICD-10-CM

## 2022-09-02 DIAGNOSIS — K21.9 GASTROESOPHAGEAL REFLUX DISEASE WITHOUT ESOPHAGITIS: ICD-10-CM

## 2022-09-02 DIAGNOSIS — E66.01 OBESITY, MORBID (HCC): ICD-10-CM

## 2022-09-02 DIAGNOSIS — D64.9 ANEMIA, UNSPECIFIED TYPE: ICD-10-CM

## 2022-09-02 DIAGNOSIS — I10 ESSENTIAL HYPERTENSION WITH GOAL BLOOD PRESSURE LESS THAN 130/80: ICD-10-CM

## 2022-09-02 DIAGNOSIS — E11.9 CONTROLLED TYPE 2 DIABETES MELLITUS WITHOUT COMPLICATION, WITHOUT LONG-TERM CURRENT USE OF INSULIN (HCC): Primary | ICD-10-CM

## 2022-09-02 PROCEDURE — 3044F HG A1C LEVEL LT 7.0%: CPT | Performed by: NURSE PRACTITIONER

## 2022-09-02 PROCEDURE — 99214 OFFICE O/P EST MOD 30 MIN: CPT | Performed by: NURSE PRACTITIONER

## 2022-09-02 RX ORDER — OLOPATADINE HYDROCHLORIDE 2 MG/ML
1 SOLUTION/ DROPS OPHTHALMIC DAILY
Qty: 5 ML | Refills: 1 | Status: SHIPPED | OUTPATIENT
Start: 2022-09-02

## 2022-09-02 RX ORDER — OMEPRAZOLE 20 MG/1
20 CAPSULE, DELAYED RELEASE ORAL DAILY
Qty: 30 CAPSULE | Refills: 2 | Status: SHIPPED | OUTPATIENT
Start: 2022-09-02 | End: 2022-10-02

## 2022-09-02 NOTE — PROGRESS NOTES
Patient identified with two identification factors, Name and Date of Birth. Chief Complaint   Patient presents with    Diabetes     6 month diabetes follow-up       Visit Vitals  /89 (BP 1 Location: Right arm, BP Patient Position: Sitting, BP Cuff Size: Adult)   Pulse 63   Temp 98.2 °F (36.8 °C) (Oral)   Resp 17   Ht 5' 3\" (1.6 m)   Wt 222 lb 12.8 oz (101.1 kg)   LMP  (LMP Unknown)   SpO2 98%   BMI 39.47 kg/m²       Health Maintenance Due   Topic    Pneumococcal 0-64 years (1 - PCV)    Foot Exam Q1     Eye Exam Retinal or Dilated     DTaP/Tdap/Td series (1 - Tdap)    Colorectal Cancer Screening Combo     Breast Cancer Screen Mammogram     COVID-19 Vaccine (4 - Booster for Pfizer series)    Shingrix Vaccine Age 50> (2 of 2)    Flu Vaccine (1)        1. \"Have you been to the ER, urgent care clinic since your last visit? Hospitalized since your last visit? \" No    2. \"Have you seen or consulted any other health care providers outside of the [address] [place] [address] since your last visit? \" Yes . Doctor DOLLY Cancer Physician     3. For patients aged 39-70: Has the patient had a colonoscopy / FIT/ Cologuard? Yes - no Care Gap present      If the patient is female:    4. For patients aged 41-77: Has the patient had a mammogram within the past 2 years? Yes - no Care Gap present      5. For patients aged 21-65: Has the patient had a pap smear?  Yes - no Care Gap present

## 2022-09-02 NOTE — PROGRESS NOTES
Mini Allison (: 1965) is a 61 y.o. female, established patient, here for evaluation of the following chief complaint(s):  Diabetes (6 month diabetes follow-up)       ASSESSMENT/PLAN:  Below is the assessment and plan developed based on review of pertinent history, physical exam, labs, studies, and medications. 1. Controlled type 2 diabetes mellitus without complication, without long-term current use of insulin (HCC)  -     HEMOGLOBIN A1C WITH EAG; Future  -     LIPID PANEL; Future  -     TSH 3RD GENERATION; Future  -     URINALYSIS W/MICROSCOPIC; Future  -     METABOLIC PANEL, BASIC; Future  2. Essential hypertension with goal blood pressure less than 130/80  -     CBC W/O DIFF; Future  -     TSH 3RD GENERATION; Future  -     URINALYSIS W/MICROSCOPIC; Future  -     METABOLIC PANEL, BASIC; Future  3. Lymphedema of right lower extremity  4. Obesity, morbid ([address]  5. Onychomycosis  -     REFERRAL TO PODIATRY  6. Anemia, unspecified type  -     CBC W/O DIFF; Future  -     FERRITIN; Future  -     IRON PROFILE; Future  7. Gastroesophageal reflux disease without esophagitis  -     omeprazole (PRILOSEC) 20 mg capsule; Take 1 Capsule by mouth daily for 30 days. , Normal, Disp-30 Capsule, R-2    Return in about 6 months (around 3/2/2023). SUBJECTIVE/OBJECTIVE:  HPI  patient comes in today for follow up diabetes. 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 Told her to use refresh. She has not been at lymphedema clinic. Resigned from job due to swelling in leg. Oncology/Breast Surgeon - Dr. Gertrudis George performed hysterectomy. Saw S for echo/cardiac workup for chemo     Hx HTN, diagnosed ~10 years ago. Taking amlodipine 5mg, losartan 25mg. Denies chest pains, SOB.   No cough, night sweats, unexplained weight loss     Hx diabetes since . Taking metformin 500mg TID with meals. Sometimes does not take 3 times daily. tolerating okay. No diarrhea. FBG usually 80-90s     Hx right breast cancer. HER2 positve. Dx in . Had lumpectomy and lymph node removal (negative bx) in 2019. Had chemo and radiation treatment. Last chemo treatment 2020 and last radiation treatment 2020. Had hysterectomy and BSO in Dec 2020. Had postop complications of wound infection. Had 6 different parasites. Family hx of breast cancer in [facility] and paternal cousins. Had mammogram done in 2020. Had colonoscopy last year at [facility]. 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   Brother dx with colon cancer at 24yo and father diagnosed with colon cancer age 39yo  No Known Allergies    Past Medical History:   Diagnosis Date    Asthma     Breast cancer ([address] 2019    Diabetes ([address]     Family history of breast cancer     Family history of colon cancer     Hypertension        Past Surgical History:   Procedure Laterality Date    HX BREAST LUMPECTOMY Right 2019    HX GYN       x2    HX HYSTERECTOMY  2020    HX LYMPH NODE DISSECTION Right     HX ORTHOPAEDIC      bilateral foot surgery/reconstructive surgery, plates and pins in feet    DC ABDOMEN SURGERY PROC UNLISTED      Cholecystectomy       Social History     Socioeconomic History    Marital status:      Spouse name: Not on file    Number of children: Not on file    Years of education: Not on file    Highest education level: Not on file   Occupational History    Not on file   Tobacco Use    Smoking status: Never    Smokeless tobacco: Never   Vaping Use    Vaping Use: Never used   Substance and Sexual Activity    Alcohol use: No    Drug use: No    Sexual activity: Not Currently   Other Topics Concern    Not on file   Social History Narrative    Not on file     Social Determinants of Health     Financial Resource Strain: Not on file   Food Insecurity: Not on file   Transportation Needs: Not on file   Physical Activity: Not on file   Stress: Not on file   Social Connections: Not on file   Intimate Partner Violence: Not on file   Housing Stability: Not on file       Family History   Problem Relation Age of Onset    Colon Cancer Father     Colon Cancer Brother     Breast Cancer Paternal Grandmother        Current Outpatient Medications   Medication Sig    olopatadine (Pataday) 0.2 % drop ophthalmic solution Administer 1 Drop to both eyes daily. omeprazole (PRILOSEC) 20 mg capsule Take 1 Capsule by mouth daily for 30 days. Ventolin HFA 90 mcg/actuation inhaler INHALE 2 PUFFS BY MOUTH EVERY 4 HOURS AS NEEDED FOR WHEEZING OR SHORTNESS OF BREATH    losartan (COZAAR) 25 mg tablet Take 1 tablet by mouth once daily    loratadine (Claritin) 10 mg tablet Take 1 Tablet by mouth daily. metFORMIN (GLUCOPHAGE) 500 mg tablet Take 1 Tablet by mouth three (3) times daily (with meals). lidocaine (LIDODERM) 5 % Apply patch to the affected area for 12 hours a day and remove for 12 hours a day. amLODIPine (NORVASC) 5 mg tablet Take 1 Tablet by mouth daily. diclofenac EC (VOLTAREN) 75 mg EC tablet TAKE 1 TABLET BY MOUTH TWICE DAILY AS NEEDED FOR  KNEE  PAIN    albuterol (PROVENTIL VENTOLIN) 2.5 mg /3 mL (0.083 %) nebu 3 mL by Nebulization route every four (4) hours as needed for Wheezing or Shortness of Breath. Indications: asthma attack    ketotifen (ZADITOR) 0.025 % (0.035 %) ophthalmic solution INSTILL 1 DROP INTO EACH EYE TWICE DAILY FOR 10 DAYS    letrozole (FEMARA) 2.5 mg tablet Take 1 Tab by mouth daily. gabapentin (NEURONTIN) 300 mg capsule Take 1 Cap by mouth three (3) times daily. VIT C-VIT D3-E-ZINC-ELDERBERRY PO Take 1 Tab by mouth daily. No current facility-administered medications for this visit. Review of Systems   Constitutional:  Negative for appetite change, chills, diaphoresis, fatigue, fever and unexpected weight change. Respiratory:  Negative for cough, shortness of breath and wheezing. Cardiovascular:  Positive for leg swelling (right leg). Negative for chest pain and palpitations. Gastrointestinal:  Negative for abdominal pain, blood in stool, constipation, diarrhea, nausea and vomiting. Endocrine: Negative for cold intolerance and heat intolerance. Genitourinary:  Negative for dysuria, flank pain, frequency, hematuria and urgency. Musculoskeletal:  Positive for arthralgias (right knee pain, bilateral foot pain). Negative for back pain and myalgias. Skin: Negative. Neurological:  Positive for numbness (fingertips). Negative for dizziness, speech difficulty, light-headedness and headaches. Psychiatric/Behavioral:  Negative for dysphoric mood and sleep disturbance. The patient is not nervous/anxious. Vitals:    09/02/22 1101   BP: 116/89   Pulse: 63   Resp: 17   Temp: 98.2 °F (36.8 °C)   TempSrc: Oral   SpO2: 98%   Weight: 222 lb 12.8 oz (101.1 kg)   Height: 5' 3\" (1.6 m)     Physical Exam  Vitals reviewed. Constitutional:       Appearance: Normal appearance. She is well-developed and well-groomed. HENT:      Right Ear: Hearing normal.      Left Ear: Hearing normal.   Neck:      Thyroid: No thyromegaly. Cardiovascular:      Rate and Rhythm: Normal rate and regular rhythm. Pulses:           Dorsalis pedis pulses are 2+ on the right side and 2+ on the left side. Heart sounds: Normal heart sounds, S1 normal and S2 normal.   Pulmonary:      Effort: Pulmonary effort is normal.      Breath sounds: Normal breath sounds. Musculoskeletal:      Right lower leg: Edema (due to lymphedema) present. Left lower leg: No edema. Lymphadenopathy:      Cervical: No cervical adenopathy. Skin:     General: Skin is warm and dry. Neurological:      Mental Status: She is alert and oriented to person, place, and time.    Psychiatric:         Attention and Perception: Attention normal.         Mood and Affect: Mood and affect normal.         Speech: Speech normal.         Behavior: Behavior normal. Behavior is cooperative. Thought Content: Thought content normal.         Cognition and Memory: Cognition and memory normal.     On this date 09/02/2022 I have spent 33 minutes reviewing previous notes, test results and face to face with the patient discussing the diagnosis and importance of compliance with the treatment plan as well as documenting on the day of the visit. An electronic signature was used to authenticate this note.   -- Lian Olivas NP

## 2022-09-03 LAB
APPEARANCE UR: CLEAR
BACTERIA #/AREA URNS HPF: ABNORMAL /[HPF]
BILIRUB UR QL STRIP: NEGATIVE
BUN SERPL-MCNC: 16 MG/DL (ref 6–24)
BUN/CREAT SERPL: 20 (ref 9–23)
CALCIUM SERPL-MCNC: 8.9 MG/DL (ref 8.7–10.2)
CASTS URNS QL MICRO: ABNORMAL /LPF
CHLORIDE SERPL-SCNC: 103 MMOL/L (ref 96–106)
CHOLEST SERPL-MCNC: 153 MG/DL (ref 100–199)
CO2 SERPL-SCNC: 24 MMOL/L (ref 20–29)
COLOR UR: YELLOW
CREAT SERPL-MCNC: 0.8 MG/DL (ref 0.57–1)
EGFR: 85 ML/MIN/1.73
EPI CELLS #/AREA URNS HPF: ABNORMAL /HPF (ref 0–10)
ERYTHROCYTE [DISTWIDTH] IN BLOOD BY AUTOMATED COUNT: 13.7 % (ref 11.7–15.4)
EST. AVERAGE GLUCOSE BLD GHB EST-MCNC: 128 MG/DL
FERRITIN SERPL-MCNC: 81 NG/ML (ref 15–150)
GLUCOSE SERPL-MCNC: 86 MG/DL (ref 65–99)
GLUCOSE UR QL STRIP: NEGATIVE
HBA1C MFR BLD: 6.1 % (ref 4.8–5.6)
HCT VFR BLD AUTO: 35.8 % (ref 34–46.6)
HDLC SERPL-MCNC: 50 MG/DL
HGB BLD-MCNC: 12.1 G/DL (ref 11.1–15.9)
HGB UR QL STRIP: NEGATIVE
IMP & REVIEW OF LAB RESULTS: NORMAL
IRON SATN MFR SERPL: 16 % (ref 15–55)
IRON SERPL-MCNC: 53 UG/DL (ref 27–159)
KETONES UR QL STRIP: ABNORMAL
LDLC SERPL CALC-MCNC: 91 MG/DL (ref 0–99)
LEUKOCYTE ESTERASE UR QL STRIP: ABNORMAL
MCH RBC QN AUTO: 30.8 PG (ref 26.6–33)
MCHC RBC AUTO-ENTMCNC: 33.8 G/DL (ref 31.5–35.7)
MCV RBC AUTO: 91 FL (ref 79–97)
MICRO URNS: ABNORMAL
NITRITE UR QL STRIP: NEGATIVE
PH UR STRIP: 5.5 [PH] (ref 5–7.5)
PLATELET # BLD AUTO: 218 X10E3/UL (ref 150–450)
POTASSIUM SERPL-SCNC: 3.9 MMOL/L (ref 3.5–5.2)
PROT UR QL STRIP: NEGATIVE
RBC # BLD AUTO: 3.93 X10E6/UL (ref 3.77–5.28)
RBC #/AREA URNS HPF: ABNORMAL /HPF (ref 0–2)
SODIUM SERPL-SCNC: 142 MMOL/L (ref 134–144)
SP GR UR STRIP: 1.02 (ref 1–1.03)
TIBC SERPL-MCNC: 337 UG/DL (ref 250–450)
TRIGL SERPL-MCNC: 59 MG/DL (ref 0–149)
TSH SERPL DL<=0.005 MIU/L-ACNC: 2.32 UIU/ML (ref 0.45–4.5)
UIBC SERPL-MCNC: 284 UG/DL (ref 131–425)
UROBILINOGEN UR STRIP-MCNC: 0.2 MG/DL (ref 0.2–1)
VLDLC SERPL CALC-MCNC: 12 MG/DL (ref 5–40)
WBC # BLD AUTO: 5.2 X10E3/UL (ref 3.4–10.8)
WBC #/AREA URNS HPF: ABNORMAL /HPF (ref 0–5)

## 2022-09-12 NOTE — PROGRESS NOTES
A1c 6.1% - diabetes still under good control. Thyroid screen negative. Kidney function normal.  Blood counts and iron profile normal (not anemic). Cholesterol numbers look great! Urine has white blood cells, which could indicate a UTI. Are you having any symptoms of UTI? We could repeat urine and send for urine culture.

## 2022-09-15 ENCOUNTER — TELEPHONE (OUTPATIENT)
Dept: FAMILY MEDICINE CLINIC | Age: 57
End: 2022-09-15

## 2022-09-15 NOTE — TELEPHONE ENCOUNTER
----- Message from Corina Henderson NP sent at 9/12/2022  2:09 PM EDT -----  A1c 6.1% - diabetes still under good control. Thyroid screen negative. Kidney function normal.  Blood counts and iron profile normal (not anemic). Cholesterol numbers look great! Urine has white blood cells, which could indicate a UTI. Are you having any symptoms of UTI? We could repeat urine and send for urine culture.

## 2022-09-20 ENCOUNTER — OFFICE VISIT (OUTPATIENT)
Dept: FAMILY MEDICINE CLINIC | Age: 57
End: 2022-09-20

## 2022-09-20 DIAGNOSIS — R82.81 PYURIA: Primary | ICD-10-CM

## 2022-09-20 NOTE — PROGRESS NOTES
Rita Morgan (: 1965) is a 61 y.o. female, established patient, here for evaluation of the following chief complaint(s):  Labs Only       ASSESSMENT/PLAN:  Below is the assessment and plan developed based on review of pertinent history, physical exam, labs, studies, and medications. 1. Pyuria  -     URINALYSIS W/MICROSCOPIC; Future  -     CULTURE, URINE; Future    An electronic signature was used to authenticate this note.   -- Dionicio Mcfarlane NP

## 2022-09-30 LAB
APPEARANCE UR: CLEAR
BACTERIA #/AREA URNS HPF: NORMAL /[HPF]
BACTERIA UR CULT: NORMAL
BILIRUB UR QL STRIP: NEGATIVE
CASTS URNS QL MICRO: NORMAL /LPF
COLOR UR: YELLOW
EPI CELLS #/AREA URNS HPF: NORMAL /HPF (ref 0–10)
GLUCOSE UR QL STRIP: NEGATIVE
HGB UR QL STRIP: NEGATIVE
KETONES UR QL STRIP: NEGATIVE
LEUKOCYTE ESTERASE UR QL STRIP: NEGATIVE
MICRO URNS: NORMAL
MICRO URNS: NORMAL
NITRITE UR QL STRIP: NEGATIVE
PH UR STRIP: 7.5 [PH] (ref 5–7.5)
PROT UR QL STRIP: NEGATIVE
RBC #/AREA URNS HPF: NORMAL /HPF (ref 0–2)
SP GR UR STRIP: 1.02 (ref 1–1.03)
UROBILINOGEN UR STRIP-MCNC: 0.2 MG/DL (ref 0.2–1)
WBC #/AREA URNS HPF: NORMAL /HPF (ref 0–5)

## 2022-09-30 NOTE — PROGRESS NOTES
Repeat urine normal  Urine culture negative for urinary tract infection.   No treatment needed  Urinalysis and urine culture did not result until today 9/30/22 at 21 [phone]

## 2023-03-02 ENCOUNTER — OFFICE VISIT (OUTPATIENT)
Dept: FAMILY MEDICINE CLINIC | Age: 58
End: 2023-03-02
Payer: MEDICAID

## 2023-03-02 VITALS
DIASTOLIC BLOOD PRESSURE: 80 MMHG | TEMPERATURE: 98.2 F | BODY MASS INDEX: 40.57 KG/M2 | HEART RATE: 68 BPM | OXYGEN SATURATION: 98 % | WEIGHT: 229 LBS | RESPIRATION RATE: 18 BRPM | SYSTOLIC BLOOD PRESSURE: 124 MMHG | HEIGHT: 63 IN

## 2023-03-02 DIAGNOSIS — B37.2 SKIN CANDIDIASIS: ICD-10-CM

## 2023-03-02 DIAGNOSIS — R22.41 MASS OF RIGHT KNEE: ICD-10-CM

## 2023-03-02 DIAGNOSIS — B35.1 ONYCHOMYCOSIS: ICD-10-CM

## 2023-03-02 DIAGNOSIS — I10 ESSENTIAL HYPERTENSION WITH GOAL BLOOD PRESSURE LESS THAN 130/80: ICD-10-CM

## 2023-03-02 DIAGNOSIS — E11.9 CONTROLLED TYPE 2 DIABETES MELLITUS WITHOUT COMPLICATION, WITHOUT LONG-TERM CURRENT USE OF INSULIN (HCC): Primary | ICD-10-CM

## 2023-03-02 DIAGNOSIS — Z23 ENCOUNTER FOR IMMUNIZATION: ICD-10-CM

## 2023-03-02 DIAGNOSIS — E66.01 OBESITY, MORBID (HCC): ICD-10-CM

## 2023-03-02 DIAGNOSIS — I89.0 LYMPHEDEMA OF RIGHT LOWER EXTREMITY: ICD-10-CM

## 2023-03-02 PROCEDURE — 90715 TDAP VACCINE 7 YRS/> IM: CPT | Performed by: NURSE PRACTITIONER

## 2023-03-02 PROCEDURE — 3074F SYST BP LT 130 MM HG: CPT | Performed by: NURSE PRACTITIONER

## 2023-03-02 PROCEDURE — 99214 OFFICE O/P EST MOD 30 MIN: CPT | Performed by: NURSE PRACTITIONER

## 2023-03-02 PROCEDURE — 3079F DIAST BP 80-89 MM HG: CPT | Performed by: NURSE PRACTITIONER

## 2023-03-02 RX ORDER — EXEMESTANE 25 MG/1
25 TABLET ORAL DAILY
COMMUNITY
Start: 2023-02-28

## 2023-03-02 RX ORDER — NYSTATIN 100000 [USP'U]/G
POWDER TOPICAL 4 TIMES DAILY
Qty: 60 G | Refills: 5 | Status: SHIPPED | OUTPATIENT
Start: 2023-03-02

## 2023-03-02 RX ORDER — KETOROLAC TROMETHAMINE 30 MG/ML
30 INJECTION, SOLUTION INTRAMUSCULAR; INTRAVENOUS ONCE
Qty: 1 ML | Refills: 0 | Status: CANCELLED
Start: 2023-03-02 | End: 2023-03-02

## 2023-03-02 RX ORDER — FUROSEMIDE 20 MG/1
TABLET ORAL
COMMUNITY
Start: 2022-12-21

## 2023-03-02 NOTE — PROGRESS NOTES
Marvene Prader (: 1965) is a 60 y.o. female, established patient, here for evaluation of the following chief complaint(s):  Diabetes (6m fu ) and Hypertension (6m fu )       ASSESSMENT/PLAN:  Below is the assessment and plan developed based on review of pertinent history, physical exam, labs, studies, and medications. 1. Controlled type 2 diabetes mellitus without complication, without long-term current use of insulin (HCC)  -     METABOLIC PANEL, COMPREHENSIVE; Future  -     HEMOGLOBIN A1C WITH EAG; Future  -      DIABETES FOOT EXAM  -     REFERRAL TO PODIATRY  -     REFERRAL TO OPHTHALMOLOGY  2. Essential hypertension with goal blood pressure less than 685/87  -     METABOLIC PANEL, COMPREHENSIVE; Future  3. Lymphedema of right lower extremity - followed by lymphedema clinic  4. Mass of right knee  -     REFERRAL TO ORTHOPEDICS  5. Onychomycosis - can use Vicks vapor rub to toenail daily for at least 1 year. Would like to see podiatry to see about removing  -     REFERRAL TO PODIATRY  6. Skin candidiasis  -     nystatin (MYCOSTATIN) powder; Apply  to affected area four (4) times daily. Under breasts and abdominal folds, Normal, Disp-60 g, R-5  7. Obesity, morbid ([address]  I have reviewed/discussed the above normal BMI with the patient. I have recommended the following interventions: dietary management education, guidance, and counseling and encourage exercise . Nohemi Balling 8. Encounter for immunization  -     TDAP, BOOSTRIX, (AGE 10 YRS+), IM  -     AR IM ADM PRQ ID SUBQ/IM NJXS 1 VACCINE    Return in about 6 months (around 2023). SUBJECTIVE/OBJECTIVE:  HPI  patient comes in today for follow up diabetes, HTN  Complain of cystic lesion on lateral right knee. States she noticed area 3 weeks ago, is painful. No redness, warmth, drainage. Hx of lymphedema in right leg  She has started with lymphedema clinic last week  Resigned from job due to swelling in leg. Hx HTN, diagnosed ~. Taking amlodipine 5mg, losartan 25mg. Denies chest pains, SOB. No cough, night sweats, unexplained weight loss. Uses furosemide 20mg prn swelling     Hx diabetes since . Taking metformin 500mg TID with meals. tolerating okay. No diarrhea. Hx right breast cancer. HER2 positve. Dx in . Had lumpectomy and lymph node removal (negative bx) in 2019. Had chemo and radiation treatment. Last chemo treatment 2020 and last radiation treatment 2020. Had hysterectomy and BSO in Dec 2020. Had postop complications of wound infection. Had 6 different parasites. Family hx of breast cancer in [facility] and paternal cousins. She is taking Aromasin daily  Oncology/Breast Surgeon - Dr. Sharla Fletcher performed hysterectomy. Saw [facility] for echo/cardiac workup for chemo     Had EGD and colonoscopy in  at [facility]. 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     No Known Allergies    Past Medical History:   Diagnosis Date    Asthma     Breast cancer ([address] 2019    Diabetes ([facility] [address]     Family history of breast cancer     Family history of colon cancer     Hypertension        Past Surgical History:   Procedure Laterality Date    HX BREAST LUMPECTOMY Right 2019    HX GYN       x2    HX HYSTERECTOMY  2020    HX LYMPH NODE DISSECTION Right     HX ORTHOPAEDIC      bilateral foot surgery/reconstructive surgery, plates and pins in feet    KS UNLISTED PROCEDURE ABDOMEN PERITONEUM & OMENTUM      Cholecystectomy       Social History     Socioeconomic History    Marital status:      Spouse name: Not on file    Number of children: Not on file    Years of education: Not on file    Highest education level: Not on file   Occupational History Not on file   Tobacco Use    Smoking status: Never    Smokeless tobacco: Never   Vaping Use    Vaping Use: Never used   Substance and Sexual Activity    Alcohol use: No    Drug use: No    Sexual activity: Not Currently   Other Topics Concern    Not on file   Social History Narrative    Not on file     Social Determinants of Health     Financial Resource Strain: High Risk    Difficulty of Paying Living Expenses: Hard   Food Insecurity: Food Insecurity Present    Worried About Running Out of Food in the Last Year: Sometimes true    Ran Out of Food in the Last Year: Sometimes true   Transportation Needs: Not on file   Physical Activity: Not on file   Stress: Not on file   Social Connections: Not on file   Intimate Partner Violence: Not on file   Housing Stability: Not on file       Family History   Problem Relation Age of Onset    Colon Cancer Father     Colon Cancer Brother     Breast Cancer Paternal Grandmother        Current Outpatient Medications   Medication Sig    furosemide (LASIX) 20 mg tablet TAKE 1 TABLET BY MOUTH ONCE DAILY AS NEEDED FOR SWELLING    exemestane (AROMASIN) 25 mg tablet Take 25 mg by mouth daily. nystatin (MYCOSTATIN) powder Apply  to affected area four (4) times daily. Under breasts and abdominal folds    diclofenac EC (VOLTAREN) 75 mg EC tablet TAKE 1 TABLET BY MOUTH TWICE DAILY AS NEEDED FOR  KNEE  PAIN    olopatadine (Pataday) 0.2 % drop ophthalmic solution Administer 1 Drop to both eyes daily. Ventolin HFA 90 mcg/actuation inhaler INHALE 2 PUFFS BY MOUTH EVERY 4 HOURS AS NEEDED FOR WHEEZING OR SHORTNESS OF BREATH    losartan (COZAAR) 25 mg tablet Take 1 tablet by mouth once daily    loratadine (Claritin) 10 mg tablet Take 1 Tablet by mouth daily. metFORMIN (GLUCOPHAGE) 500 mg tablet Take 1 Tablet by mouth three (3) times daily (with meals). lidocaine (LIDODERM) 5 % Apply patch to the affected area for 12 hours a day and remove for 12 hours a day.     amLODIPine (NORVASC) 5 mg tablet Take 1 Tablet by mouth daily. albuterol (PROVENTIL VENTOLIN) 2.5 mg /3 mL (0.083 %) nebu 3 mL by Nebulization route every four (4) hours as needed for Wheezing or Shortness of Breath. Indications: asthma attack    ketotifen (ZADITOR) 0.025 % (0.035 %) ophthalmic solution INSTILL 1 DROP INTO EACH EYE TWICE DAILY FOR 10 DAYS    gabapentin (NEURONTIN) 300 mg capsule Take 1 Cap by mouth three (3) times daily. VIT C-VIT D3-E-ZINC-ELDERBERRY PO Take 1 Tab by mouth daily. letrozole (FEMARA) 2.5 mg tablet Take 1 Tab by mouth daily. (Patient not taking: Reported on 3/2/2023)     No current facility-administered medications for this visit. Review of Systems   Constitutional:  Negative for appetite change, chills, diaphoresis, fatigue, fever and unexpected weight change. Respiratory:  Negative for cough, shortness of breath and wheezing. Cardiovascular:  Positive for leg swelling (right leg). Negative for chest pain and palpitations. Gastrointestinal:  Negative for abdominal pain, blood in stool, constipation, diarrhea, nausea and vomiting. Endocrine: Negative for cold intolerance and heat intolerance. Genitourinary:  Negative for dysuria, flank pain, frequency, hematuria and urgency. Musculoskeletal:  Positive for arthralgias (right knee pain, right knee cyst that is painful). Negative for back pain and myalgias. Skin:  Positive for rash (under left breast and abd pannus). Right great toenail thickened   Neurological:  Positive for numbness (fingertips). Negative for dizziness, speech difficulty, light-headedness and headaches. Psychiatric/Behavioral:  Negative for dysphoric mood and sleep disturbance. The patient is not nervous/anxious. Vitals:    03/02/23 1152   BP: 124/80   Pulse: 68   Resp: 18   Temp: 98.2 °F (36.8 °C)   TempSrc: Oral   SpO2: 98%   Weight: 229 lb (103.9 kg)   Height: 5' 3\" (1.6 m)       Physical Exam  Vitals reviewed.    Constitutional:       Appearance: Normal appearance. She is well-developed and well-groomed. HENT:      Right Ear: Hearing normal.      Left Ear: Hearing normal.   Neck:      Thyroid: No thyromegaly. Cardiovascular:      Rate and Rhythm: Normal rate and regular rhythm. Pulses:           Dorsalis pedis pulses are 2+ on the right side and 2+ on the left side. Heart sounds: Normal heart sounds, S1 normal and S2 normal.   Pulmonary:      Effort: Pulmonary effort is normal.      Breath sounds: Normal breath sounds. Musculoskeletal:      Right lower leg: Edema (due to lymphedema) present. Left lower leg: No edema. Lymphadenopathy:      Cervical: No cervical adenopathy. Skin:     General: Skin is warm and dry. Comments: ~1cm cystic mass right lateral knee. Mass is mobile, painful to touch. No erythema, no drainage. Neurological:      Mental Status: She is alert and oriented to person, place, and time. Psychiatric:         Attention and Perception: Attention normal.         Mood and Affect: Mood and affect normal.         Speech: Speech normal.         Behavior: Behavior normal. Behavior is cooperative. Thought Content: Thought content normal.         Cognition and Memory: Cognition and memory normal.     On this date 03/02/2023 I have spent 32 minutes reviewing previous notes, test results and face to face with the patient discussing the diagnosis and importance of compliance with the treatment plan as well as documenting on the day of the visit. An electronic signature was used to authenticate this note.   -- Chelsy Loja NP

## 2023-03-02 NOTE — LETTER
Dear [facility],     Please fax us the most recent Colonoscopy records so that we may update the patient's records for continuity of care.      Our fax number: [phone]    Patient:   Johney Ganser  1965

## 2023-03-02 NOTE — PATIENT INSTRUCTIONS
Vaccine Information Statement    Tdap (Tetanus, Diphtheria, Pertussis) Vaccine: What You Need to Know     Many vaccine information statements are available in Polish and other languages. See www.immunize.org/vis. Hojas de información sobre vacunas están disponibles en español y en muchos otros idiomas. Visite www.immunize.org/vis. 1. Why get vaccinated? Tdap vaccine can prevent tetanus, diphtheria, and pertussis. Diphtheria and pertussis spread from person to person. Tetanus enters the body through cuts or wounds. TETANUS (T) causes painful stiffening of the muscles. Tetanus can lead to serious health problems, including being unable to open the mouth, having trouble swallowing and breathing, or death. DIPHTHERIA (D) can lead to difficulty breathing, heart failure, paralysis, or death. PERTUSSIS (aP), also known as whooping cough, can cause uncontrollable, violent coughing that makes it hard to breathe, eat, or drink. Pertussis can be extremely serious especially in babies and young children, causing pneumonia, convulsions, brain damage, or death. In teens and adults, it can cause weight loss, loss of bladder control, passing out, and rib fractures from severe coughing. 2. Tdap vaccine     Tdap is only for children 7 years and older, adolescents, and adults. Adolescents should receive a single dose of Tdap, preferably at age 6 or 15 years. Pregnant people should get a dose of Tdap during every pregnancy, preferably during the early part of the third trimester, to help protect the  from pertussis. Infants are most at risk for severe, life-threatening complications from pertussis. Adults who have never received Tdap should get a dose of Tdap.       Also, adults should receive a booster dose of either Tdap or Td (a different vaccine that protects against tetanus and diphtheria but not pertussis) every 10 years, or after 5 years in the case of a severe or dirty wound or burn.     Tdap may be given at the same time as other vaccines. 3. Talk with your health care provider    Tell your vaccination provider if the person getting the vaccine:  Has had an allergic reaction after a previous dose of any vaccine that protects against tetanus, diphtheria, or pertussis, or has any severe, life-threatening allergies   Has had a coma, decreased level of consciousness, or prolonged seizures within 7 days after a previous dose of any pertussis vaccine (DTP, DTaP, or Tdap)  Has seizures or another nervous system problem  Has ever had Guillain-Barré Syndrome (also called GBS)  Has had severe pain or swelling after a previous dose of any vaccine that protects against tetanus or diphtheria    In some cases, your health care provider may decide to postpone Tdap vaccination until a future visit. People with minor illnesses, such as a cold, may be vaccinated. People who are moderately or severely ill should usually wait until they recover before getting Tdap vaccine. Your health care provider can give you more information. 4. Risks of a vaccine reaction    Pain, redness, or swelling where the shot was given, mild fever, headache, feeling tired, and nausea, vomiting, diarrhea, or stomachache sometimes happen after Tdap vaccination. People sometimes faint after medical procedures, including vaccination. Tell your provider if you feel dizzy or have vision changes or ringing in the ears. As with any medicine, there is a very remote chance of a vaccine causing a severe allergic reaction, other serious injury, or death. 5. What if there is a serious problem? An allergic reaction could occur after the vaccinated person leaves the clinic.  If you see signs of a severe allergic reaction (hives, swelling of the face and throat, difficulty breathing, a fast heartbeat, dizziness, or weakness), call 9-1-1 and get the person to the nearest hospital.    For other signs that concern you, call your health care provider. Adverse reactions should be reported to the Vaccine Adverse Event Reporting System (VAERS). Your health care provider will usually file this report, or you can do it yourself. Visit the VAERS website at www.vaers. [facility].gov or call 8-441.623.9882. VAERS is only for reporting reactions, and VAERS staff members do not give medical advice. 6. The National Vaccine Injury Compensation Program    The [facility] Vaccine Injury Compensation Program (VICP) is a federal program that was created to compensate people who may have been injured by certain vaccines. Claims regarding alleged injury or death due to vaccination have a time limit for filing, which may be as short as two years. Visit the VICP website at www.[employer].gov/vaccinecompensation or call 0-261.958.2971 to learn about the program and about filing a claim. 7. How can I learn more? Ask your health care provider. Call your local or state health department. Visit the website of the Food and Drug Administration (FDA) for vaccine package inserts and additional information at www.fda.gov/vaccines-blood-biologics/vaccines. Contact the Centers for Disease Control and Prevention (CDC): Call 4-739.454.8223 (1-800-CDC-INFO) or  Visit CDCs website at www.cdc.gov/vaccines. Vaccine Information Statement   Tdap (Tetanus, Diphtheria, Pertussis) Vaccine  8/6/2021  [id number]   Department of Health and Human Services  Centers for Disease Control and Prevention    Office Use Only

## 2023-03-02 NOTE — PROGRESS NOTES
Chief Complaint   Patient presents with    Diabetes     6m fu     Hypertension     6m fu        1. \"Have you been to the ER, urgent care clinic since your last visit? Hospitalized since your last visit? \" No    2. \"Have you seen or consulted any other health care providers outside of the [address] [place] [address] since your last visit? \" No     3. For patients aged 39-70: Has the patient had a colonoscopy / FIT/ Cologuard? Yes - Care Gap present. Rooming MA/LPN to request most recent results Dr Mansoor Simon      If the patient is female:    4. For patients aged 41-77: Has the patient had a mammogram within the past 2 years? Yes - Care Gap present. Most recent result on file      5. For patients aged 21-65: Has the patient had a pap smear?  No - hysterectomy    Health Maintenance Due   Topic Date Due    Pneumococcal 0-64 years (1 - PCV) Never done    Foot Exam Q1  Never done    Eye Exam Retinal or Dilated  Never done    Hepatitis B Vaccine (1 of 3 - Risk 3-dose series) Never done    DTaP/Tdap/Td series (1 - Tdap) Never done    Colorectal Cancer Screening Combo  Never done    Shingles Vaccine (2 of 2) 02/01/2022    Flu Vaccine (1) Never done    COVID-19 Vaccine (5 - Booster for Pfizer series) 09/02/2022    Diabetic Alb to Cr ratio (uACR) test  03/07/2023

## 2023-03-03 LAB
ALBUMIN SERPL-MCNC: 4.4 G/DL (ref 3.8–4.9)
ALBUMIN/GLOB SERPL: 2.2 {RATIO} (ref 1.2–2.2)
ALP SERPL-CCNC: 72 IU/L (ref 44–121)
ALT SERPL-CCNC: 26 IU/L (ref 0–32)
AST SERPL-CCNC: 28 IU/L (ref 0–40)
BILIRUB SERPL-MCNC: 0.3 MG/DL (ref 0–1.2)
BUN SERPL-MCNC: 17 MG/DL (ref 6–24)
BUN/CREAT SERPL: 24 (ref 9–23)
CALCIUM SERPL-MCNC: 9.1 MG/DL (ref 8.7–10.2)
CHLORIDE SERPL-SCNC: 105 MMOL/L (ref 96–106)
CO2 SERPL-SCNC: 19 MMOL/L (ref 20–29)
CREAT SERPL-MCNC: 0.7 MG/DL (ref 0.57–1)
EGFRCR SERPLBLD CKD-EPI 2021: 100 ML/MIN/1.73
EST. AVERAGE GLUCOSE BLD GHB EST-MCNC: 131 MG/DL
GLOBULIN SER CALC-MCNC: 2 G/DL (ref 1.5–4.5)
GLUCOSE SERPL-MCNC: 87 MG/DL (ref 70–99)
HBA1C MFR BLD: 6.2 % (ref 4.8–5.6)
POTASSIUM SERPL-SCNC: 3.9 MMOL/L (ref 3.5–5.2)
PROT SERPL-MCNC: 6.4 G/DL (ref 6–8.5)
SODIUM SERPL-SCNC: 143 MMOL/L (ref 134–144)

## 2023-03-15 ENCOUNTER — NURSE TRIAGE (OUTPATIENT)
Dept: OTHER | Facility: CLINIC | Age: 58
End: 2023-03-15

## 2023-03-15 NOTE — TELEPHONE ENCOUNTER
[facility]    Received call from Myra at [facility] with Red Flag Complaint. Subjective: Caller states \"I have pain and swelling in the right side of my face. There is a tooth root in the bottom right side of my mouth and I think there is an infection. I called the dentist and they dont' have availability until April\"     Current Symptoms: swelling    Onset:  today    Associated Symptoms:     Pain Severity: 5/10; aching;     Temperature:      What has been tried:     LMP: NA Pregnant: NA    Recommended disposition: See in Office Today    Care advice provided, patient verbalizes understanding; denies any other questions or concerns; instructed to call back for any new or worsening symptoms. Mylene at [facility] will call patient back for scheduling due to patient disconnect. Attention Provider: Thank you for allowing me to participate in the care of your patient. The patient was connected to triage in response to information provided to the ECC. Please do not respond through this encounter as the response is not directed to a shared pool. Reason for Disposition   Toothache    Protocols used:  Face Swelling-ADULT-[place]

## 2023-03-16 ENCOUNTER — TELEPHONE (OUTPATIENT)
Dept: FAMILY MEDICINE CLINIC | Age: 58
End: 2023-03-16

## 2023-03-16 DIAGNOSIS — K04.7 DENTAL INFECTION: Primary | ICD-10-CM

## 2023-03-16 RX ORDER — AMOXICILLIN AND CLAVULANATE POTASSIUM 875; 125 MG/1; MG/1
1 TABLET, FILM COATED ORAL 2 TIMES DAILY
Qty: 20 TABLET | Refills: 0 | Status: SHIPPED | OUTPATIENT
Start: 2023-03-16 | End: 2023-03-26

## 2023-03-16 NOTE — TELEPHONE ENCOUNTER
Patient called stating she was returning a call back from [place]. She stated if she doesn't hear anything back from her today then she will go to the ER. She can be reached at [phone].

## 2023-03-16 NOTE — TELEPHONE ENCOUNTER
Verified patient with two type of identifiers. Spoke to pt - she has an infected tooth. She was unable to get a dental visit until the first week of April. Requesting antibiotic sent to [place] on [address] [facility]

## 2023-03-16 NOTE — TELEPHONE ENCOUNTER
Sent prescription for Augmentin for dental infection  Patient to see dentist for further management    Orders Placed This Encounter    amoxicillin-clavulanate (AUGMENTIN) 875-125 mg per tablet     Sig: Take 1 Tablet by mouth two (2) times a day for 10 days.      Dispense:  20 Tablet     Refill:  0

## 2023-03-21 ENCOUNTER — TELEPHONE (OUTPATIENT)
Dept: FAMILY MEDICINE CLINIC | Age: 58
End: 2023-03-21

## 2023-03-21 DIAGNOSIS — T36.95XA ANTIBIOTIC-INDUCED YEAST INFECTION: Primary | ICD-10-CM

## 2023-03-21 DIAGNOSIS — B37.9 ANTIBIOTIC-INDUCED YEAST INFECTION: Primary | ICD-10-CM

## 2023-03-21 RX ORDER — FLUCONAZOLE 150 MG/1
150 TABLET ORAL DAILY
Qty: 2 TABLET | Refills: 0 | Status: SHIPPED | OUTPATIENT
Start: 2023-03-21 | End: 2023-03-22

## 2023-03-21 NOTE — TELEPHONE ENCOUNTER
Orders Placed This Encounter    fluconazole (DIFLUCAN) 150 mg tablet     Sig: Take 1 Tablet by mouth daily for 1 day. Repeat in 3-4 days if no improvement in symptoms. FDA advises cautious use in pregnancy. Dispense:  2 Tablet     Refill:  0     Can use OTC monistat for topical relief of itching if needed.

## 2023-03-21 NOTE — TELEPHONE ENCOUNTER
Patient would like for  Yohana Gordon to send her something into the pharmacy for yeast infection she can be reached @ [phone]

## 2023-03-21 NOTE — TELEPHONE ENCOUNTER
Verified patient with two type of identifiers. Spoke to pt - she requested a rx for yeast infection after taking an antibiotic. She is experiencing itching sensation. Please advise.

## 2023-03-29 ENCOUNTER — TELEPHONE (OUTPATIENT)
Dept: FAMILY MEDICINE CLINIC | Age: 58
End: 2023-03-29

## 2023-03-29 NOTE — TELEPHONE ENCOUNTER
----- Message from Alessandra Parisi sent at 3/28/2023  4:57 PM EDT -----  Subject: Message to Provider    QUESTIONS  Information for Provider? Pt stated she needs all of her medications   refilled. She stated she thought her provider said they would put in   refills to last her until september, but stated the pharmacy told her that   her prescriptions needed to be renewed. ---------------------------------------------------------------------------  --------------  Anabel IBANEZ  [phone]; OK to leave message on voicemail  ---------------------------------------------------------------------------  --------------  SCRIPT ANSWERS  Relationship to Patient?  Self

## 2023-03-31 ENCOUNTER — TELEPHONE (OUTPATIENT)
Dept: FAMILY MEDICINE CLINIC | Age: 58
End: 2023-03-31

## 2023-03-31 NOTE — TELEPHONE ENCOUNTER
----- Message from [facility] sent at 3/31/2023  1:07 PM EDT -----  Subject: Message to Provider    QUESTIONS  Information for Provider? pt would like a call back from nurse   ---------------------------------------------------------------------------  --------------  [address]  [phone]; OK to leave message on voicemail  ---------------------------------------------------------------------------  --------------  SCRIPT ANSWERS  Relationship to Patient?  Self

## 2023-04-03 ENCOUNTER — TELEPHONE (OUTPATIENT)
Dept: FAMILY MEDICINE CLINIC | Age: 58
End: 2023-04-03

## 2023-04-03 NOTE — TELEPHONE ENCOUNTER
Patient called in - she is requesting documentation stating she would benefit from a mobility scooter due to her lymphedema. Please advise.

## 2023-05-24 RX ORDER — LORATADINE 10 MG/1
TABLET ORAL
Qty: 90 TABLET | Refills: 3 | Status: SHIPPED | OUTPATIENT
Start: 2023-05-24

## 2023-05-24 NOTE — TELEPHONE ENCOUNTER
Last appointment: 3/2/23  Next appointment: 9/5/23  Previous refill encounter(s): 3/2/22 #90 with 3 refills    Requested Prescriptions     Pending Prescriptions Disp Refills    loratadine (CLARITIN) 10 MG tablet [Pharmacy Med Name: ALLERGY RELIEF (KYLIE) 10MG TAB] 90 tablet 3     Sig: Take 1 tablet by mouth once daily         For Pharmacy Admin Tracking Only    Program: Medication Refill  CPA in place:    Recommendation Provided To:    Intervention Detail: New Rx: 1, reason: Patient Preference  Intervention Accepted By:   Boris Taylor Closed?:    Time Spent (min): 5

## 2023-08-23 RX ORDER — DICLOFENAC SODIUM 75 MG/1
TABLET, DELAYED RELEASE ORAL
Qty: 60 TABLET | Refills: 0 | Status: SHIPPED | OUTPATIENT
Start: 2023-08-23

## 2023-08-23 NOTE — TELEPHONE ENCOUNTER
Last appointment: 3/2/23  Next appointment: 9/5/23  Previous refill encounter(s): 2/12/23 #60    Requested Prescriptions     Pending Prescriptions Disp Refills    diclofenac (VOLTAREN) 75 MG EC tablet [Pharmacy Med Name: Diclofenac Sodium 75 MG Oral Tablet Delayed Release] 60 tablet 0     Sig: TAKE 1 TABLET BY MOUTH TWICE DAILY AS NEEDED FOR  KNEE  PAIN         For Pharmacy Admin Tracking Only    Program: Medication Refill  CPA in place:    Recommendation Provided To:    Intervention Detail: New Rx: 1, reason: Patient Preference  Intervention Accepted By:   Mikey Ortiz Closed?:    Time Spent (min): 5

## 2023-09-18 RX ORDER — ALBUTEROL SULFATE 90 UG/1
AEROSOL, METERED RESPIRATORY (INHALATION)
Qty: 18 G | Refills: 1 | Status: SHIPPED | OUTPATIENT
Start: 2023-09-18

## 2023-09-29 ENCOUNTER — OFFICE VISIT (OUTPATIENT)
Age: 58
End: 2023-09-29

## 2023-09-29 VITALS
RESPIRATION RATE: 16 BRPM | DIASTOLIC BLOOD PRESSURE: 84 MMHG | TEMPERATURE: 98.3 F | HEIGHT: 63 IN | HEART RATE: 62 BPM | WEIGHT: 223.4 LBS | SYSTOLIC BLOOD PRESSURE: 124 MMHG | BODY MASS INDEX: 39.58 KG/M2

## 2023-09-29 DIAGNOSIS — I83.93 VARICOSE VEINS OF BOTH LOWER EXTREMITIES, UNSPECIFIED WHETHER COMPLICATED: ICD-10-CM

## 2023-09-29 DIAGNOSIS — R60.0 BILATERAL LOWER EXTREMITY EDEMA: ICD-10-CM

## 2023-09-29 DIAGNOSIS — E11.9 TYPE 2 DIABETES MELLITUS WITHOUT COMPLICATION, UNSPECIFIED WHETHER LONG TERM INSULIN USE (HCC): Primary | ICD-10-CM

## 2023-09-29 DIAGNOSIS — B37.9 ANTIBIOTIC-INDUCED YEAST INFECTION: ICD-10-CM

## 2023-09-29 DIAGNOSIS — J30.9 ALLERGIC RHINITIS, UNSPECIFIED SEASONALITY, UNSPECIFIED TRIGGER: ICD-10-CM

## 2023-09-29 DIAGNOSIS — Z12.11 COLON CANCER SCREENING: ICD-10-CM

## 2023-09-29 DIAGNOSIS — I10 ESSENTIAL (PRIMARY) HYPERTENSION: ICD-10-CM

## 2023-09-29 DIAGNOSIS — T36.95XA ANTIBIOTIC-INDUCED YEAST INFECTION: ICD-10-CM

## 2023-09-29 LAB
ALBUMIN SERPL-MCNC: 3.7 G/DL (ref 3.5–5)
ALBUMIN/GLOB SERPL: 1.2 (ref 1.1–2.2)
ALP SERPL-CCNC: 66 U/L (ref 45–117)
ALT SERPL-CCNC: 29 U/L (ref 12–78)
ANION GAP SERPL CALC-SCNC: 1 MMOL/L (ref 5–15)
APPEARANCE UR: CLEAR
AST SERPL-CCNC: 20 U/L (ref 15–37)
BACTERIA URNS QL MICRO: ABNORMAL /HPF
BILIRUB SERPL-MCNC: 0.3 MG/DL (ref 0.2–1)
BILIRUB UR QL: NEGATIVE
BUN SERPL-MCNC: 16 MG/DL (ref 6–20)
BUN/CREAT SERPL: 23 (ref 12–20)
CALCIUM SERPL-MCNC: 8.9 MG/DL (ref 8.5–10.1)
CHLORIDE SERPL-SCNC: 111 MMOL/L (ref 97–108)
CHOLEST SERPL-MCNC: 159 MG/DL
CO2 SERPL-SCNC: 30 MMOL/L (ref 21–32)
COLOR UR: ABNORMAL
CREAT SERPL-MCNC: 0.69 MG/DL (ref 0.55–1.02)
CREAT UR-MCNC: 107 MG/DL
EPITH CASTS URNS QL MICRO: ABNORMAL /LPF
ERYTHROCYTE [DISTWIDTH] IN BLOOD BY AUTOMATED COUNT: 13.6 % (ref 11.5–14.5)
GLOBULIN SER CALC-MCNC: 3.2 G/DL (ref 2–4)
GLUCOSE SERPL-MCNC: 91 MG/DL (ref 65–100)
GLUCOSE UR STRIP.AUTO-MCNC: NEGATIVE MG/DL
HBA1C MFR BLD: 5.7 %
HCT VFR BLD AUTO: 35 % (ref 35–47)
HDLC SERPL-MCNC: 52 MG/DL
HDLC SERPL: 3.1 (ref 0–5)
HGB BLD-MCNC: 11.2 G/DL (ref 11.5–16)
HGB UR QL STRIP: NEGATIVE
HYALINE CASTS URNS QL MICRO: ABNORMAL /LPF (ref 0–5)
KETONES UR QL STRIP.AUTO: NEGATIVE MG/DL
LDLC SERPL CALC-MCNC: 96.6 MG/DL (ref 0–100)
LEUKOCYTE ESTERASE UR QL STRIP.AUTO: ABNORMAL
MCH RBC QN AUTO: 30.4 PG (ref 26–34)
MCHC RBC AUTO-ENTMCNC: 32 G/DL (ref 30–36.5)
MCV RBC AUTO: 95.1 FL (ref 80–99)
MICROALBUMIN UR-MCNC: 1.57 MG/DL
MICROALBUMIN/CREAT UR-RTO: 15 MG/G (ref 0–30)
NITRITE UR QL STRIP.AUTO: NEGATIVE
NRBC # BLD: 0 K/UL (ref 0–0.01)
NRBC BLD-RTO: 0 PER 100 WBC
PH UR STRIP: 6.5 (ref 5–8)
PLATELET # BLD AUTO: 201 K/UL (ref 150–400)
PMV BLD AUTO: 11 FL (ref 8.9–12.9)
POTASSIUM SERPL-SCNC: 4.2 MMOL/L (ref 3.5–5.1)
PROT SERPL-MCNC: 6.9 G/DL (ref 6.4–8.2)
PROT UR STRIP-MCNC: NEGATIVE MG/DL
RBC # BLD AUTO: 3.68 M/UL (ref 3.8–5.2)
RBC #/AREA URNS HPF: ABNORMAL /HPF (ref 0–5)
SODIUM SERPL-SCNC: 142 MMOL/L (ref 136–145)
SP GR UR REFRACTOMETRY: 1.02 (ref 1–1.03)
TRIGL SERPL-MCNC: 52 MG/DL
TSH SERPL DL<=0.05 MIU/L-ACNC: 2.13 UIU/ML (ref 0.36–3.74)
UROBILINOGEN UR QL STRIP.AUTO: 0.2 EU/DL (ref 0.2–1)
VLDLC SERPL CALC-MCNC: 10.4 MG/DL
WBC # BLD AUTO: 5.1 K/UL (ref 3.6–11)
WBC URNS QL MICRO: ABNORMAL /HPF (ref 0–4)

## 2023-09-29 PROCEDURE — 3074F SYST BP LT 130 MM HG: CPT | Performed by: NURSE PRACTITIONER

## 2023-09-29 RX ORDER — BENZONATATE 200 MG/1
200 CAPSULE ORAL 3 TIMES DAILY PRN
Qty: 30 CAPSULE | Refills: 0 | Status: SHIPPED | OUTPATIENT
Start: 2023-09-29 | End: 2023-10-09

## 2023-09-29 RX ORDER — GABAPENTIN 600 MG/1
600 TABLET ORAL 3 TIMES DAILY
COMMUNITY
Start: 2023-07-25

## 2023-09-29 RX ORDER — FEXOFENADINE HCL 180 MG/1
180 TABLET ORAL DAILY
Qty: 90 TABLET | Refills: 1 | Status: SHIPPED | OUTPATIENT
Start: 2023-09-29

## 2023-09-29 RX ORDER — FLUCONAZOLE 150 MG/1
150 TABLET ORAL
Qty: 2 TABLET | Refills: 0 | Status: SHIPPED | OUTPATIENT
Start: 2023-09-29 | End: 2023-10-05

## 2023-09-29 RX ORDER — FLUTICASONE PROPIONATE 50 MCG
2 SPRAY, SUSPENSION (ML) NASAL DAILY
Qty: 48 G | Refills: 1 | Status: SHIPPED | OUTPATIENT
Start: 2023-09-29

## 2023-09-29 SDOH — ECONOMIC STABILITY: HOUSING INSECURITY
IN THE LAST 12 MONTHS, WAS THERE A TIME WHEN YOU DID NOT HAVE A STEADY PLACE TO SLEEP OR SLEPT IN A SHELTER (INCLUDING NOW)?: NO

## 2023-09-29 SDOH — ECONOMIC STABILITY: FOOD INSECURITY: WITHIN THE PAST 12 MONTHS, THE FOOD YOU BOUGHT JUST DIDN'T LAST AND YOU DIDN'T HAVE MONEY TO GET MORE.: SOMETIMES TRUE

## 2023-09-29 SDOH — ECONOMIC STABILITY: INCOME INSECURITY: HOW HARD IS IT FOR YOU TO PAY FOR THE VERY BASICS LIKE FOOD, HOUSING, MEDICAL CARE, AND HEATING?: HARD

## 2023-09-29 SDOH — ECONOMIC STABILITY: FOOD INSECURITY: WITHIN THE PAST 12 MONTHS, YOU WORRIED THAT YOUR FOOD WOULD RUN OUT BEFORE YOU GOT MONEY TO BUY MORE.: SOMETIMES TRUE

## 2023-10-01 ASSESSMENT — ENCOUNTER SYMPTOMS
CONSTIPATION: 0
ABDOMINAL PAIN: 0
VOMITING: 0
SHORTNESS OF BREATH: 0
NAUSEA: 0
COUGH: 0
BLOOD IN STOOL: 0
DIARRHEA: 0

## 2023-10-06 ENCOUNTER — TELEPHONE (OUTPATIENT)
Age: 58
End: 2023-10-06

## 2023-10-06 DIAGNOSIS — B35.1 ONYCHOMYCOSIS: Primary | ICD-10-CM

## 2023-10-06 NOTE — TELEPHONE ENCOUNTER
----- Message from Valeria Mccloud sent at 10/4/2023  9:54 AM EDT -----  Subject: Referral Request    Reason for referral request? Patient has a fungus on her right big toe   that she forgot to speak with Dr. Bora Liang about at her last appointment. Requests referral to a Podiatrist unless Dr. Bora Liang can do something   herself. Provider patient wants to be referred to(if known):     Provider Phone Number(if known):     Additional Information for Provider?   ---------------------------------------------------------------------------  --------------  Alpesh Navarro Yvonne    [phone]; OK to leave message on voicemail  ---------------------------------------------------------------------------  --------------

## 2023-10-18 ENCOUNTER — TELEPHONE (OUTPATIENT)
Age: 58
End: 2023-10-18

## 2023-10-18 RX ORDER — DICLOFENAC SODIUM 75 MG/1
TABLET, DELAYED RELEASE ORAL
Qty: 60 TABLET | Refills: 2 | Status: SHIPPED | OUTPATIENT
Start: 2023-10-18

## 2023-10-18 NOTE — TELEPHONE ENCOUNTER
Patient would like to get the medication diclofenac (VOLTAREN) 75 MG EC tablet called in.   Please give her a call @ [phone]

## 2023-10-18 NOTE — TELEPHONE ENCOUNTER
Orders Placed This Encounter    diclofenac (VOLTAREN) 75 MG EC tablet     Sig: TAKE 1 TABLET BY MOUTH TWICE DAILY AS NEEDED FOR  KNEE  PAIN     Dispense:  60 tablet     Refill:  2

## 2023-11-16 ENCOUNTER — TELEPHONE (OUTPATIENT)
Age: 58
End: 2023-11-16

## 2023-11-16 DIAGNOSIS — H60.509 ACUTE OTITIS EXTERNA, UNSPECIFIED LATERALITY, UNSPECIFIED TYPE: Primary | ICD-10-CM

## 2023-11-16 NOTE — TELEPHONE ENCOUNTER
Patient would like to get an antibiotic called in for severe ear ache she can be reached @ [phone]

## 2023-11-20 RX ORDER — OFLOXACIN 3 MG/ML
5 SOLUTION AURICULAR (OTIC) 2 TIMES DAILY
Qty: 5 ML | Refills: 0 | Status: SHIPPED | OUTPATIENT
Start: 2023-11-20 | End: 2023-11-25

## 2023-11-20 NOTE — TELEPHONE ENCOUNTER
I sent a prescription for antibiotic ear drops. Orders Placed This Encounter    ofloxacin (FLOXIN) 0.3 % otic solution     Sig: Place 5 drops in ear(s) 2 times daily for 5 days     Dispense:  5 mL     Refill:  0     Let patient know that I apologize for not having her message sooner, messages go to the nurse box first, and they normally have 48 hours to sort messages and route to me. If her symptoms have not improved after the 5 days, let me know.   I will be in office tomorrow 11/21, then out of the office until 11/28  Thanks

## 2023-11-20 NOTE — TELEPHONE ENCOUNTER
Patient left a message asking for an antibiotic for her ear. She stated that every time she washes her hair and gets water in her ear, it causes an infection. She was semi-irate that this has not been taken care of yet and she does not want to go to urgent care when she has a PCP. Last visit:  9/29/23  Next appt:  3/29/24        For Pharmacy Admin Tracking Only    Program: Medication Refill  CPA in place:    Recommendation Provided To:    Intervention Detail: New Rx: 1, reason: Patient Preference  Intervention Accepted By:   Willian Chin Closed?:    Time Spent (min): 5

## 2023-12-13 RX ORDER — LOSARTAN POTASSIUM 25 MG/1
TABLET ORAL
Qty: 90 TABLET | Refills: 3 | Status: SHIPPED | OUTPATIENT
Start: 2023-12-13

## 2023-12-13 NOTE — TELEPHONE ENCOUNTER
Last appointment: 9/29/23  Next appointment: 3/29/24  Previous refill encounter(s): 4/5/23 #90 with 2 refills    Requested Prescriptions     Pending Prescriptions Disp Refills    losartan (COZAAR) 25 MG tablet [Pharmacy Med Name: Losartan Potassium 25 MG Oral Tablet] 90 tablet 3     Sig: Take 1 tablet by mouth once daily         For Pharmacy Admin Tracking Only    Program: Medication Refill  CPA in place:    Recommendation Provided To:    Intervention Detail: New Rx: 1, reason: Patient Preference  Intervention Accepted By:   Brady  Closed?:    Time Spent (min): 5

## 2024-01-17 ENCOUNTER — TELEMEDICINE (OUTPATIENT)
Age: 59
End: 2024-01-17
Payer: COMMERCIAL

## 2024-01-17 ENCOUNTER — TELEPHONE (OUTPATIENT)
Age: 59
End: 2024-01-17

## 2024-01-17 DIAGNOSIS — J40 BRONCHITIS: Primary | ICD-10-CM

## 2024-01-17 PROCEDURE — 99213 OFFICE O/P EST LOW 20 MIN: CPT | Performed by: NURSE PRACTITIONER

## 2024-01-17 RX ORDER — AZITHROMYCIN 250 MG/1
TABLET, FILM COATED ORAL
Qty: 6 TABLET | Refills: 0 | Status: SHIPPED | OUTPATIENT
Start: 2024-01-17

## 2024-01-17 RX ORDER — BENZONATATE 200 MG/1
200 CAPSULE ORAL 3 TIMES DAILY PRN
Qty: 30 CAPSULE | Refills: 0 | Status: SHIPPED | OUTPATIENT
Start: 2024-01-17 | End: 2024-01-27

## 2024-01-17 RX ORDER — PREDNISONE 10 MG/1
TABLET ORAL
Qty: 1 EACH | Refills: 0 | Status: SHIPPED | OUTPATIENT
Start: 2024-01-17

## 2024-01-17 ASSESSMENT — PATIENT HEALTH QUESTIONNAIRE - PHQ9
SUM OF ALL RESPONSES TO PHQ QUESTIONS 1-9: 0
1. LITTLE INTEREST OR PLEASURE IN DOING THINGS: 0
SUM OF ALL RESPONSES TO PHQ QUESTIONS 1-9: 0
2. FEELING DOWN, DEPRESSED OR HOPELESS: 0
SUM OF ALL RESPONSES TO PHQ9 QUESTIONS 1 & 2: 0

## 2024-01-17 ASSESSMENT — ENCOUNTER SYMPTOMS
SINUS PRESSURE: 0
RHINORRHEA: 0
DIARRHEA: 0
WHEEZING: 0
COUGH: 1
ABDOMINAL PAIN: 0
SHORTNESS OF BREATH: 0
SINUS PAIN: 0
SORE THROAT: 0
NAUSEA: 0
CHEST TIGHTNESS: 0
VOMITING: 0

## 2024-01-17 NOTE — TELEPHONE ENCOUNTER
----- Message from Qianmary Aldana sent at 1/16/2024 11:21 AM EST -----  Subject: Message to Provider    QUESTIONS  Information for Provider? pt already had the flu and was put on meds for   it. she now is having a wet cough but cant get mucus to come out and her   cough pills and OTC cough syrup is not working needs to know if she needs   to be seen again for that or if another type of cough syrup can be sent to   pharmacy to help   ---------------------------------------------------------------------------  --------------  CALL BACK INFO  [phone]; OK to leave message on voicemail  ---------------------------------------------------------------------------  --------------  SCRIPT ANSWERS  Relationship to Patient? Self

## 2024-01-17 NOTE — PROGRESS NOTES
Chief Complaint   Patient presents with    Cough    Congestion    Ear Fullness       1. \"Have you been to the ER, urgent care clinic since your last visit?  Hospitalized since your last visit?\" No    2. \"Have you seen or consulted any other health care providers outside of the [facility] since your last visit?\" No     3. For patients aged 45-75: Has the patient had a colonoscopy / FIT/ Cologuard? No      If the patient is female:    4. For patients aged 40-74: Has the patient had a mammogram within the past 2 years? Yes      5. For patients aged 21-65: Has the patient had a pap smear? Yes          The patient, Eli Moreno, identity was verified by name and .  Health Maintenance Due   Topic Date Due    HIV screen  Never done    Diabetic retinal exam  Never done    Colorectal Cancer Screen  2022    COVID-19 Vaccine (2023- season) 2023    Respiratory Syncytial Virus (RSV) Pregnant or age 60 yrs+ (1 - 1-dose 60+ series) Never done    Breast cancer screen  2023      
appetite change, chills, diaphoresis, fatigue and fever.   HENT:  Positive for congestion. Negative for ear pain, postnasal drip, rhinorrhea, sinus pressure, sinus pain, sneezing and sore throat.    Respiratory:  Positive for cough. Negative for chest tightness, shortness of breath and wheezing.    Cardiovascular:  Negative for chest pain and palpitations.   Gastrointestinal:  Negative for abdominal pain, diarrhea, nausea and vomiting.   Genitourinary: Negative.    Musculoskeletal:  Negative for myalgias.   Allergic/Immunologic: Positive for environmental allergies.   Neurological:  Negative for dizziness and headaches.          Objective   Patient-Reported Vitals  No data recorded     Physical Exam  Constitutional: [x] Appears well-developed and well-nourished [x] No apparent distress      Mental status: [x] Alert and awake  [x] Oriented to person/place/time [x] Able to follow commands      Neck: [x] No visualized mass    Pulmonary/Chest: [x] Respiratory effort normal   [x] No visualized signs of difficulty breathing or respiratory distresS     Musculoskeletal:   [x] Normal range of motion of neck    Neurological:        [x] No Facial Asymmetry (Cranial nerve 7 motor function) (limited exam due to video visit)           Skin:        [x] No significant exanthematous lesions or discoloration noted on facial skin            Psychiatric:       [x] Normal Affect       [x] No Hallucinations       On this date 1/17/2024 I have spent 20 minutes reviewing previous notes, test results and face to face (virtual) with the patient discussing the diagnosis and importance of compliance with the treatment plan as well as documenting on the day of the visit.    Eli De León, was evaluated through a synchronous (real-time) audio-video encounter. The patient (or guardian if applicable) is aware that this is a billable service, which includes applicable co-pays. This Virtual Visit was conducted with patient's (and/or legal

## 2024-01-25 ENCOUNTER — CLINICAL DOCUMENTATION (OUTPATIENT)
Age: 59
End: 2024-01-25

## 2024-02-13 DIAGNOSIS — J40 BRONCHITIS: Primary | ICD-10-CM

## 2024-02-13 RX ORDER — AMLODIPINE BESYLATE 5 MG/1
5 TABLET ORAL DAILY
Qty: 30 TABLET | Refills: 0 | Status: SHIPPED | OUTPATIENT
Start: 2024-02-13

## 2024-02-13 RX ORDER — BENZONATATE 200 MG/1
200 CAPSULE ORAL 3 TIMES DAILY PRN
Qty: 30 CAPSULE | Refills: 0 | Status: SHIPPED | OUTPATIENT
Start: 2024-02-13

## 2024-02-13 RX ORDER — OLOPATADINE HYDROCHLORIDE 2 MG/ML
1 SOLUTION/ DROPS OPHTHALMIC DAILY
Qty: 5 ML | Refills: 0 | Status: SHIPPED | OUTPATIENT
Start: 2024-02-13

## 2024-02-13 NOTE — TELEPHONE ENCOUNTER
Last appointment: 1/17/24  Next appointment: 3/29/24  Previous refill encounter(s): 9/2/22 Pataday, 4/5/23 Norvasc #90 with 2 refills, 1/17/24 Tessalon #30    Requested Prescriptions     Pending Prescriptions Disp Refills    olopatadine (PATADAY) 0.2 % SOLN ophthalmic solution 5 mL 0     Sig: Place 1 drop into both eyes daily    amLODIPine (NORVASC) 5 MG tablet 30 tablet 0     Sig: Take 1 tablet by mouth daily    benzonatate (TESSALON) 200 MG capsule 30 capsule 0     Sig: Take 1 capsule by mouth 3 times daily as needed for Cough         For Pharmacy Admin Tracking Only    Program: Medication Refill  CPA in place:    Recommendation Provided To:   Intervention Detail: New Rx: 3, reason: Patient Preference  Intervention Accepted By:   Gap Closed?:    Time Spent (min): 5

## 2024-03-06 NOTE — TELEPHONE ENCOUNTER
Last appointment: 1/17/24  Next appointment: 3/29/24  Previous refill encounter(s): 10/18/23 #60 with 2 refills    Requested Prescriptions     Pending Prescriptions Disp Refills    diclofenac (VOLTAREN) 75 MG EC tablet [Pharmacy Med Name: Diclofenac Sodium 75 MG Oral Tablet Delayed Release] 60 tablet 0     Sig: TAKE 1 TABLET BY MOUTH TWICE DAILY AS NEEDED FOR  KNEE  PAIN         For Pharmacy Admin Tracking Only    Program: Medication Refill  CPA in place:    Recommendation Provided To:   Intervention Detail: New Rx: 1, reason: Patient Preference  Intervention Accepted By:   Gap Closed?:    Time Spent (min): 5   None

## 2024-03-07 RX ORDER — DICLOFENAC SODIUM 75 MG/1
TABLET, DELAYED RELEASE ORAL
Qty: 60 TABLET | Refills: 1 | Status: SHIPPED | OUTPATIENT
Start: 2024-03-07

## 2024-03-11 RX ORDER — OLOPATADINE HYDROCHLORIDE 2 MG/ML
1 SOLUTION/ DROPS OPHTHALMIC DAILY
Qty: 5 ML | Refills: 0 | Status: SHIPPED | OUTPATIENT
Start: 2024-03-11

## 2024-03-11 NOTE — TELEPHONE ENCOUNTER
Patient would like to get a refill on  olopatadine (PATADAY) 0.2 % SOLN ophthalmic solution  eyes are burning please fill today her call back number is [phone]

## 2024-03-12 ENCOUNTER — OFFICE VISIT (OUTPATIENT)
Age: 59
End: 2024-03-12
Payer: COMMERCIAL

## 2024-03-12 VITALS
WEIGHT: 219 LBS | OXYGEN SATURATION: 99 % | DIASTOLIC BLOOD PRESSURE: 71 MMHG | TEMPERATURE: 98.2 F | BODY MASS INDEX: 38.8 KG/M2 | SYSTOLIC BLOOD PRESSURE: 125 MMHG | RESPIRATION RATE: 16 BRPM | HEART RATE: 61 BPM | HEIGHT: 63 IN

## 2024-03-12 DIAGNOSIS — H04.202 EYE TEARING, LEFT: ICD-10-CM

## 2024-03-12 DIAGNOSIS — J01.10 ACUTE NON-RECURRENT FRONTAL SINUSITIS: Primary | ICD-10-CM

## 2024-03-12 PROCEDURE — 3078F DIAST BP <80 MM HG: CPT | Performed by: FAMILY MEDICINE

## 2024-03-12 PROCEDURE — 99213 OFFICE O/P EST LOW 20 MIN: CPT | Performed by: FAMILY MEDICINE

## 2024-03-12 PROCEDURE — 3074F SYST BP LT 130 MM HG: CPT | Performed by: FAMILY MEDICINE

## 2024-03-12 RX ORDER — VENLAFAXINE HYDROCHLORIDE 37.5 MG/1
37.5 CAPSULE, EXTENDED RELEASE ORAL DAILY
COMMUNITY
Start: 2024-02-05

## 2024-03-12 RX ORDER — LORATADINE 10 MG/1
10 TABLET ORAL DAILY
COMMUNITY
Start: 2024-03-07

## 2024-03-12 RX ORDER — AMOXICILLIN AND CLAVULANATE POTASSIUM 875; 125 MG/1; MG/1
1 TABLET, FILM COATED ORAL 2 TIMES DAILY
Qty: 20 TABLET | Refills: 0 | Status: SHIPPED | OUTPATIENT
Start: 2024-03-12 | End: 2024-03-22

## 2024-03-12 NOTE — PROGRESS NOTES
Eli De León (:  1965) is a 59 y.o. female,Established patient, here for evaluation of the following chief complaint(s):  Eye Drainage (Left- 5 days), Headache, and Ear Fullness (Left- 2 weeks)         ASSESSMENT/PLAN:  1. Acute non-recurrent frontal sinusitis  2. Eye tearing, left  -     [facility] - Jose Keller MD, Ophthalmology, [place]      No follow-ups on file.         Subjective   SUBJECTIVE/OBJECTIVE:  HPI Has problem with L eye running. Also has been having a headache over L eye. L eye drains clear tears. Also feels fluid backing up in L ear. Has been taking tylenol prn headache- minimal relief. NO fever, chills. Also has lots of mucus in nose. Uses flonase. Currently on disability for breast cancer. Finished chemo and RT 3 years ago.     Review of Systems       Objective   Physical Exam  HENT:      Head:      Comments: Tenderness L frontal sinus  Cardiovascular:      Rate and Rhythm: Normal rate and regular rhythm.      Heart sounds: Normal heart sounds. No murmur heard.  Pulmonary:      Effort: Pulmonary effort is normal.      Breath sounds: Normal breath sounds.   Abdominal:      General: Abdomen is flat. Bowel sounds are normal.      Palpations: Abdomen is soft.   Musculoskeletal:      Right lower leg: No edema.      Left lower leg: No edema.                  An electronic signature was used to authenticate this note.    --Elliott Cueva MD

## 2024-03-12 NOTE — PROGRESS NOTES
Chief Complaint   Patient presents with    Eye Drainage    Headache         1. \"Have you been to the ER, urgent care clinic since your last visit?  Hospitalized since your last visit?\" NO    2. \"Have you seen or consulted any other health care providers outside of the [facility] since your last visit?\" NO     3. For patients aged 45-75: Has the patient had a colonoscopy / FIT/ Cologuard? NO      If the patient is female:    4. For patients aged 40-74: Has the patient had a mammogram within the past 2 years? NO      5. For patients aged 21-65: Has the patient had a pap smear? N/A      Health Maintenance Due   Topic Date Due    HIV screen  Never done    Diabetic retinal exam  Never done    Colorectal Cancer Screen  05/06/2022    COVID-19 Vaccine (5 - 2023-24 season) 09/01/2023    Breast cancer screen  12/28/2023    Respiratory Syncytial Virus (RSV) Pregnant or age 60 yrs+ (1 - 1-dose 60+ series) Never done    Diabetic foot exam  03/02/2024

## 2024-03-29 NOTE — TELEPHONE ENCOUNTER
Last appointment: 3/12/24  Next appointment: 9/13/24  Previous refill encounter(s): 2/13/24 #30    Requested Prescriptions     Pending Prescriptions Disp Refills    amLODIPine (NORVASC) 5 MG tablet [Pharmacy Med Name: amLODIPine Besylate 5 MG Oral Tablet] 30 tablet 0     Sig: Take 1 tablet by mouth once daily         For Pharmacy Admin Tracking Only    Program: Medication Refill  CPA in place:    Recommendation Provided To:   Intervention Detail: New Rx: 1, reason: Patient Preference  Intervention Accepted By:   Gap Closed?:    Time Spent (min): 5

## 2024-03-29 NOTE — TELEPHONE ENCOUNTER
Last appointment: 3/12/24  Next appointment: 9/13/24  Previous refill encounter(s): 9/18/23 #1 with 1 refill    Requested Prescriptions     Pending Prescriptions Disp Refills    VENTOLIN  (90 Base) MCG/ACT inhaler [Pharmacy Med Name: Ventolin  (90 Base) MCG/ACT Inhalation Aerosol Solution] 18 g 0     Sig: INHALE 2 PUFFS BY MOUTH EVERY 4 HOURS AS NEEDED FOR WHEEZING FOR SHORTNESS OF BREATH         For Pharmacy Admin Tracking Only    Program: Medication Refill  CPA in place:    Recommendation Provided To:   Intervention Detail: New Rx: 1, reason: Patient Preference  Intervention Accepted By:   Gap Closed?:    Time Spent (min): 5

## 2024-04-01 RX ORDER — AMLODIPINE BESYLATE 5 MG/1
5 TABLET ORAL DAILY
Qty: 30 TABLET | Refills: 0 | Status: SHIPPED | OUTPATIENT
Start: 2024-04-01

## 2024-04-01 RX ORDER — ALBUTEROL SULFATE 90 UG/1
AEROSOL, METERED RESPIRATORY (INHALATION)
Qty: 18 G | Refills: 0 | Status: SHIPPED | OUTPATIENT
Start: 2024-04-01

## 2024-05-02 NOTE — PATIENT INSTRUCTIONS
LAURI MENDEZ    [id number]    89y      Female    INTERVAL HPI/OVERNIGHT EVENTS:  patient being downgraded from NSI. Patient had a LM2 occlusions and is s/p TNK    nih 16 on admission     REVIEW OF SYSTEMS:    CONSTITUTIONAL: No fever, weight loss, or fatigue  RESPIRATORY: No cough, wheezing, hemoptysis; No shortness of breath  CARDIOVASCULAR: No chest pain, palpitations  GASTROINTESTINAL: No abdominal or epigastric pain. No nausea, vomiting  NEUROLOGICAL: No headaches, memory loss, loss of strength.  MISCELLANEOUS:      Vital Signs Last 24 Hrs  T(C): 36.9 (02 May 2024 11:52), Max: 36.9 (02 May 2024 04:40)  T(F): 98.4 (02 May 2024 11:52), Max: 98.5 (02 May 2024 04:40)  HR: 62 (02 May 2024 13:00) (59 - 76)  BP: 171/65 (02 May 2024 13:00) (125/108 - 177/63)  BP(mean): 95 (02 May 2024 13:00) (70 - 120)  RR: 17 (02 May 2024 13:00) (13 - 20)  SpO2: 99% (02 May 2024 13:00) (96% - 100%)    Parameters below as of 02 May 2024 12:00  Patient On (Oxygen Delivery Method): room air        PHYSICAL EXAM:    GENERAL: NAD, well-groomed  HEENT: facial droop   NECK: soft, Supple, No JVD,   CHEST/LUNG: Clear to auscultation bilaterally; No wheezing  HEART: S1S2+, Regular rate and rhythm; No murmurs, rubs, or gallops  ABDOMEN: Soft, Nontender, Nondistended; Bowel sounds present  EXTREMITIES:  2+ Peripheral Pulses, No clubbing, cyanosis, or edema  SKIN: No rashes or lesions  NEURO: Awake    LABS:                        11.4   6.19  )-----------( 152      ( 02 May 2024 13:50 )             35.0     05-02    140  |  104  |  21.1<H>  ----------------------------<  99  3.7   |  23.0  |  0.87    Ca    8.4      02 May 2024 03:30  Phos  3.8     05-02  Mg     2.0     05-02    TPro  8.1  /  Alb  4.0  /  TBili  0.6  /  DBili  x   /  AST  31  /  ALT  31  /  AlkPhos  99  05-01    PT/INR - ( 01 May 2024 10:15 )   PT: 11.3 sec;   INR: 0.96 ratio         PTT - ( 01 May 2024 10:15 )  PTT:36.8 sec  Urinalysis Basic - ( 02 May 2024 03:30 )    Color: x / Appearance: x / SG: x / pH: x  Gluc: 99 mg/dL / Ketone: x  / Bili: x / Urobili: x   Blood: x / Protein: x / Nitrite: x   Leuk Esterase: x / RBC: x / WBC x   Sq Epi: x / Non Sq Epi: x / Bacteria: x          MEDICATIONS  (STANDING):  aspirin  chewable 81 milliGRAM(s) Oral daily  atorvastatin 40 milliGRAM(s) Oral at bedtime  chlorhexidine 2% Cloths 1 Application(s) Topical <User Schedule>  dextrose 50% Injectable 12.5 Gram(s) IV Push once  dextrose 50% Injectable 25 Gram(s) IV Push once  enoxaparin Injectable 40 milliGRAM(s) SubCutaneous every 24 hours  furosemide    Tablet 40 milliGRAM(s) Oral daily  insulin lispro (ADMELOG) corrective regimen sliding scale   SubCutaneous every 6 hours  metoprolol succinate ER 50 milliGRAM(s) Oral daily  polyethylene glycol 3350 17 Gram(s) Oral daily  senna 2 Tablet(s) Oral at bedtime    MEDICATIONS  (PRN):  acetaminophen     Tablet .. 650 milliGRAM(s) Oral every 6 hours PRN Temp greater or equal to 38C (100.4F), Mild Pain (1 - 3)      RADIOLOGY & ADDITIONAL TESTS:    tte ordered    mri ordered   Pinkeye: Care Instructions  Your Care Instructions     Pinkeye is redness and swelling of the eye surface and the conjunctiva (the lining of the eyelid and the covering of the white part of the eye). Pinkeye is also called conjunctivitis. Pinkeye is often caused by infection with bacteria or a virus. Dry air, allergies, smoke, and chemicals are other common causes. Pinkeye often clears on its own in 7 to 10 days. Antibiotics only help if the pinkeye is caused by bacteria. Pinkeye caused by infection spreads easily. If an allergy or chemical is causing pinkeye, it will not go away unless you can avoid whatever is causing it. Follow-up care is a key part of your treatment and safety. Be sure to make and go to all appointments, and call your doctor if you are having problems. It's also a good idea to know your test results and keep a list of the medicines you take. How can you care for yourself at home? · Wash your hands often. Always wash them before and after you treat pinkeye or touch your eyes or face. · Use moist cotton or a clean, wet cloth to remove crust. Wipe from the inside corner of the eye to the outside. Use a clean part of the cloth for each wipe. · Put cold or warm wet cloths on your eye a few times a day if the eye hurts. · Do not wear contact lenses or eye makeup until the pinkeye is gone. Throw away any eye makeup you were using when you got pinkeye. Clean your contacts and storage case. If you wear disposable contacts, use a new pair when your eye has cleared and it is safe to wear contacts again. · If the doctor gave you antibiotic ointment or eyedrops, use them as directed. Use the medicine for as long as instructed, even if your eye starts looking better soon. Keep the bottle tip clean, and do not let it touch the eye area. · To put in eyedrops or ointment:  ? Tilt your head back, and pull your lower eyelid down with one finger. ?  Drop or squirt the medicine inside the lower lid.  ? Close your eye for 30 to 60 seconds to let the drops or ointment move around. ? Do not touch the ointment or dropper tip to your eyelashes or any other surface. · Do not share towels, pillows, or washcloths while you have pinkeye. When should you call for help? Call your doctor now or seek immediate medical care if:    · You have pain in your eye, not just irritation on the surface.     · You have a change in vision or loss of vision.     · You have an increase in discharge from the eye.     · Your eye has not started to improve or begins to get worse within 48 hours after you start using antibiotics.     · Pinkeye lasts longer than 7 days. Watch closely for changes in your health, and be sure to contact your doctor if you have any problems. Where can you learn more? Go to http://www.gray.com/  Enter Y392 in the search box to learn more about \"Pinkeye: Care Instructions. \"  Current as of: February 26, 2020               Content Version: 12.8  © 2006-2021 [employer]. Care instructions adapted under license by [employer] (which disclaims liability or warranty for this information). If you have questions about a medical condition or this instruction, always ask your healthcare professional. [address] any warranty or liability for your use of this information. Allergies: Care Instructions  Your Care Instructions     Allergies occur when your body's defense system (immune system) overreacts to certain substances. The immune system treats a harmless substance as if it were a harmful germ or virus. Many things can make this happen. These include pollens, medicine, food, dust, animal dander, and mold. Allergies can be mild or severe. Mild allergies can be managed with home treatment. But medicine may be needed to prevent problems. Managing your allergies is an important part of staying healthy.  Your doctor may suggest that you have allergy testing to help find out what is causing your allergies. Severe allergies can cause reactions that affect your whole body (anaphylactic reactions). Your doctor may prescribe a shot of epinephrine to carry with you in case you have a severe reaction. Learn how to give yourself the shot and keep it with you at all times. Make sure it is not . Follow-up care is a key part of your treatment and safety. Be sure to make and go to all appointments, and call your doctor if you are having problems. It's also a good idea to know your test results and keep a list of the medicines you take. How can you care for yourself at home? · If you have been told by your doctor that dust or dust mites are causing your allergy, decrease the dust around your bed:  ? Wash sheets, pillowcases, and other bedding in hot water every week. ? Use dust-proof covers for pillows, duvets, and mattresses. Avoid plastic covers because they tear easily and do not \"breathe. \" Wash as instructed on the label. ? Do not use any blankets and pillows that you do not need. ? Use blankets that you can wash in your washing machine. ? Consider removing drapes and carpets, which attract and hold dust, from your bedroom. · If you are allergic to house dust and mites, do not use home humidifiers. Your doctor can suggest ways you can control dust and mites. · Look for signs of cockroaches. Cockroaches cause allergic reactions. Use cockroach baits to get rid of them. Then, clean your home well. Cockroaches like areas where grocery bags, newspapers, empty bottles, or cardboard boxes are stored. Do not keep these inside your home, and keep trash and food containers sealed. Seal off any spots where cockroaches might enter your home. · If you are allergic to mold, get rid of furniture, rugs, and drapes that smell musty. Check for mold in the bathroom. · If you are allergic to outdoor pollen or mold spores, use air-conditioning. Change or clean all filters every month. Keep windows closed. · If you are allergic to pollen, stay inside when pollen counts are high. Use a vacuum  with a HEPA filter or a double-thickness filter at least two times each week. · Stay inside when air pollution is bad. Avoid paint fumes, perfumes, and other strong odors. · Avoid conditions that make your allergies worse. Stay away from smoke. Do not smoke or let anyone else smoke in your house. Do not use fireplaces or wood-burning stoves. · If you are allergic to your pets, change the air filter in your furnace every month. Use high-efficiency filters. · If you are allergic to pet dander, keep pets outside or out of your bedroom. Old carpet and cloth furniture can hold a lot of animal dander. You may need to replace them. When should you call for help? Give an epinephrine shot if:    · You think you are having a severe allergic reaction.     · You have symptoms in more than one body area, such as mild nausea and an itchy mouth. After giving an epinephrine shot call 911, even if you feel better. Call 911 if:    · You have symptoms of a severe allergic reaction. These may include:  ? Sudden raised, red areas (hives) all over your body. ? Swelling of the throat, mouth, lips, or tongue. ? Trouble breathing. ? Passing out (losing consciousness). Or you may feel very lightheaded or suddenly feel weak, confused, or restless.     · You have been given an epinephrine shot, even if you feel better. Call your doctor now or seek immediate medical care if:    · You have symptoms of an allergic reaction, such as:  ? A rash or hives (raised, red areas on the skin). ? Itching. ? Swelling. ? Belly pain, nausea, or vomiting. Watch closely for changes in your health, and be sure to contact your doctor if:    · You do not get better as expected. Where can you learn more?   Go to http://www.gray.com/  Enter W171 in the search box to LAURI MENDEZ    [id number]    89y      Female    INTERVAL HPI/OVERNIGHT EVENTS:  patient being downgraded from NSI. Patient had a LM2 occlusions and is s/p TNK. Patient seen at bedside with daughter and . patient is aao x 2-3, repeats some words, minimally confused    nih 16 on admission     REVIEW OF SYSTEMS:    CONSTITUTIONAL: No fever, weight loss, or fatigue  RESPIRATORY: No cough, wheezing, hemoptysis; No shortness of breath  CARDIOVASCULAR: No chest pain, palpitations  GASTROINTESTINAL: No abdominal or epigastric pain. No nausea, vomiting  NEUROLOGICAL: No headaches, memory loss, loss of strength.  MISCELLANEOUS:      Vital Signs Last 24 Hrs  T(C): 36.9 (02 May 2024 11:52), Max: 36.9 (02 May 2024 04:40)  T(F): 98.4 (02 May 2024 11:52), Max: 98.5 (02 May 2024 04:40)  HR: 62 (02 May 2024 13:00) (59 - 76)  BP: 171/65 (02 May 2024 13:00) (125/108 - 177/63)  BP(mean): 95 (02 May 2024 13:00) (70 - 120)  RR: 17 (02 May 2024 13:00) (13 - 20)  SpO2: 99% (02 May 2024 13:00) (96% - 100%)    Parameters below as of 02 May 2024 12:00  Patient On (Oxygen Delivery Method): room air        PHYSICAL EXAM:    GENERAL: NAD, well-groomed  HEENT: facial droop   NECK: soft, Supple, No JVD,   CHEST/LUNG: Clear to auscultation bilaterally; No wheezing  HEART: S1S2+, Regular rate and rhythm; No murmurs, rubs, or gallops  ABDOMEN: Soft, Nontender, Nondistended; Bowel sounds present  EXTREMITIES:  2+ Peripheral Pulses, No clubbing, cyanosis, or edema  SKIN: No rashes or lesions  NEURO: Awake, alert    LABS:                        11.4   6.19  )-----------( 152      ( 02 May 2024 13:50 )             35.0     05-02    140  |  104  |  21.1<H>  ----------------------------<  99  3.7   |  23.0  |  0.87    Ca    8.4      02 May 2024 03:30  Phos  3.8     05-02  Mg     2.0     05-02    TPro  8.1  /  Alb  4.0  /  TBili  0.6  /  DBili  x   /  AST  31  /  ALT  31  /  AlkPhos  99  05-01    PT/INR - ( 01 May 2024 10:15 )   PT: 11.3 sec;   INR: 0.96 ratio         PTT - ( 01 May 2024 10:15 )  PTT:36.8 sec  Urinalysis Basic - ( 02 May 2024 03:30 )    Color: x / Appearance: x / SG: x / pH: x  Gluc: 99 mg/dL / Ketone: x  / Bili: x / Urobili: x   Blood: x / Protein: x / Nitrite: x   Leuk Esterase: x / RBC: x / WBC x   Sq Epi: x / Non Sq Epi: x / Bacteria: x          MEDICATIONS  (STANDING):  aspirin  chewable 81 milliGRAM(s) Oral daily  atorvastatin 40 milliGRAM(s) Oral at bedtime  chlorhexidine 2% Cloths 1 Application(s) Topical <User Schedule>  dextrose 50% Injectable 12.5 Gram(s) IV Push once  dextrose 50% Injectable 25 Gram(s) IV Push once  enoxaparin Injectable 40 milliGRAM(s) SubCutaneous every 24 hours  furosemide    Tablet 40 milliGRAM(s) Oral daily  insulin lispro (ADMELOG) corrective regimen sliding scale   SubCutaneous every 6 hours  metoprolol succinate ER 50 milliGRAM(s) Oral daily  polyethylene glycol 3350 17 Gram(s) Oral daily  senna 2 Tablet(s) Oral at bedtime    MEDICATIONS  (PRN):  acetaminophen     Tablet .. 650 milliGRAM(s) Oral every 6 hours PRN Temp greater or equal to 38C (100.4F), Mild Pain (1 - 3)      RADIOLOGY & ADDITIONAL TESTS:    tte ordered    mri ordered   learn more about \"Allergies: Care Instructions. \"  Current as of: November 6, 2020               Content Version: 12.8  © 2006-2021 Healthwise, [facility]. Care instructions adapted under license by [employer] (which disclaims liability or warranty for this information). If you have questions about a medical condition or this instruction, always ask your healthcare professional. [address] any warranty or liability for your use of this information.

## 2024-05-03 RX ORDER — DICLOFENAC SODIUM 75 MG/1
TABLET, DELAYED RELEASE ORAL
Qty: 60 TABLET | Refills: 0 | Status: SHIPPED | OUTPATIENT
Start: 2024-05-03

## 2024-05-03 NOTE — TELEPHONE ENCOUNTER
Last appointment: 3/12/24  Next appointment: 9/13/24  Previous refill encounter(s): 3/7/24 #60 with 1 refill    Requested Prescriptions     Pending Prescriptions Disp Refills    diclofenac (VOLTAREN) 75 MG EC tablet [Pharmacy Med Name: Diclofenac Sodium 75 MG Oral Tablet Delayed Release] 60 tablet 0     Sig: TAKE 1 TABLET BY MOUTH TWICE DAILY AS NEEDED KNEE  PAIN         For Pharmacy Admin Tracking Only    Program: Medication Refill  CPA in place:    Recommendation Provided To:   Intervention Detail: New Rx: 1, reason: Patient Preference  Intervention Accepted By:   Gap Closed?:    Time Spent (min): 5

## 2024-05-15 ENCOUNTER — TELEPHONE (OUTPATIENT)
Age: 59
End: 2024-05-15

## 2024-05-23 ENCOUNTER — TELEPHONE (OUTPATIENT)
Age: 59
End: 2024-05-23

## 2024-05-23 RX ORDER — AMLODIPINE BESYLATE 5 MG/1
5 TABLET ORAL DAILY
Qty: 90 TABLET | Refills: 0 | Status: SHIPPED | OUTPATIENT
Start: 2024-05-23 | End: 2024-05-24 | Stop reason: SDUPTHER

## 2024-05-23 NOTE — TELEPHONE ENCOUNTER
Last appointment: 3/12/24  Next appointment: 9/13/24  Previous refill encounter(s): 4/1/24 #30    Requested Prescriptions     Pending Prescriptions Disp Refills    amLODIPine (NORVASC) 5 MG tablet [Pharmacy Med Name: amLODIPine Besylate 5 MG Oral Tablet] 90 tablet 0     Sig: Take 1 tablet by mouth once daily         For Pharmacy Admin Tracking Only    Program: Medication Refill  CPA in place:    Recommendation Provided To:   Intervention Detail: New Rx: 1, reason: Patient Preference  Intervention Accepted By:   Gap Closed?:    Time Spent (min): 5

## 2024-05-23 NOTE — TELEPHONE ENCOUNTER
Patient needs prescription for Amlodipine sent to [facility] Fax #[phone] Patient can be reached at [phone].

## 2024-05-24 RX ORDER — AMLODIPINE BESYLATE 5 MG/1
5 TABLET ORAL DAILY
Qty: 90 TABLET | Refills: 0 | Status: SHIPPED | OUTPATIENT
Start: 2024-05-24

## 2024-06-21 ENCOUNTER — TELEPHONE (OUTPATIENT)
Age: 59
End: 2024-06-21

## 2024-06-21 NOTE — TELEPHONE ENCOUNTER
Patient would like to get something for shingles please give her a call she has already had both shots of shingles please give her a call @ [phone]

## 2024-07-11 DIAGNOSIS — J30.9 ALLERGIC RHINITIS, UNSPECIFIED SEASONALITY, UNSPECIFIED TRIGGER: Primary | ICD-10-CM

## 2024-07-13 RX ORDER — DICLOFENAC SODIUM 75 MG/1
TABLET, DELAYED RELEASE ORAL
Qty: 60 TABLET | Refills: 0 | Status: SHIPPED | OUTPATIENT
Start: 2024-07-13

## 2024-07-13 RX ORDER — LORATADINE 10 MG/1
10 TABLET ORAL DAILY
Qty: 30 TABLET | Refills: 1 | Status: SHIPPED | OUTPATIENT
Start: 2024-07-13

## 2024-07-15 ENCOUNTER — TELEPHONE (OUTPATIENT)
Age: 59
End: 2024-07-15

## 2024-07-15 NOTE — TELEPHONE ENCOUNTER
Patient contacted unable to reach. Message left on voicemail and made aware that medications have been refilled at [facility] on [address]

## 2024-07-15 NOTE — TELEPHONE ENCOUNTER
Patient is requesting a RX refill   metFORMIN (GLUCOPHAGE) 500 MG tablet   diclofenac (VOLTAREN) 75 MG EC tablet   EQ ALL DAY ALLERGY RELIEF 10 MG tablet  please fill today she can be reached @ [phone]

## 2024-07-29 RX ORDER — LIDOCAINE 50 MG/G
PATCH TOPICAL
Qty: 90 PATCH | Refills: 1 | Status: SHIPPED | OUTPATIENT
Start: 2024-07-29

## 2024-07-29 RX ORDER — PREDNISONE 10 MG/1
TABLET ORAL
Qty: 21 EACH | Refills: 0 | OUTPATIENT
Start: 2024-07-29

## 2024-07-29 NOTE — TELEPHONE ENCOUNTER
Last appointment: 9/29/23  Next appointment: 9/13/24  Previous refill encounter(s): 4/5/23 Lidoderm, 1/17/24 Prednisone #21    Requested Prescriptions     Pending Prescriptions Disp Refills    lidocaine (LIDODERM) 5 % 90 patch 1     Sig: Apply patch to the affected area for 12 hours a day and remove for 12 hours a day.    predniSONE 10 MG (21) TBPK 21 each 0     Sig: Take as directed per pack instructions         For Pharmacy Admin Tracking Only    Program: Medication Refill  CPA in place:    Recommendation Provided To:   Intervention Detail: New Rx: 2, reason: Patient Preference  Intervention Accepted By:   Gap Closed?:    Time Spent (min): 5

## 2024-08-08 ENCOUNTER — TELEPHONE (OUTPATIENT)
Age: 59
End: 2024-08-08

## 2024-08-08 NOTE — TELEPHONE ENCOUNTER
Patient requesting a call back at [phone] to discuss left ear  fluid in ears, mucus in throat.  Patient would like prescriptions  that were prescribed  for above symptoms to be sent to [facility]  [phone].

## 2024-08-27 RX ORDER — AMLODIPINE BESYLATE 5 MG/1
5 TABLET ORAL DAILY
Qty: 30 TABLET | Refills: 0 | Status: SHIPPED | OUTPATIENT
Start: 2024-08-27

## 2024-08-27 RX ORDER — DICLOFENAC SODIUM 75 MG/1
TABLET, DELAYED RELEASE ORAL
Qty: 60 TABLET | Refills: 0 | Status: SHIPPED | OUTPATIENT
Start: 2024-08-27

## 2024-08-27 NOTE — TELEPHONE ENCOUNTER
Last appointment: 3/12/24  Next appointment: 9/13/24  Previous refill encounter(s): 5/24/24 #90    Requested Prescriptions     Pending Prescriptions Disp Refills    amLODIPine (NORVASC) 5 MG tablet [Pharmacy Med Name: amLODIPine Besylate 5 MG Oral Tablet] 30 tablet 0     Sig: Take 1 tablet by mouth once daily         For Pharmacy Admin Tracking Only    Program: Medication Refill  CPA in place:    Recommendation Provided To:   Intervention Detail: New Rx: 1, reason: Patient Preference  Intervention Accepted By:   Gap Closed?:    Time Spent (min): 5

## 2024-08-27 NOTE — TELEPHONE ENCOUNTER
Last appointment: 3/12/24  Next appointment: 9/13/24  Previous refill encounter(s): 7/13/24 #60    Requested Prescriptions     Pending Prescriptions Disp Refills    diclofenac (VOLTAREN) 75 MG EC tablet [Pharmacy Med Name: Diclofenac Sodium 75 MG Oral Tablet Delayed Release] 60 tablet 0     Sig: TAKE 1 TABLET BY MOUTH TWICE DAILY AS NEEDED FOR  KNEE  PAIN         For Pharmacy Admin Tracking Only    Program: Medication Refill  CPA in place:    Recommendation Provided To:   Intervention Detail: New Rx: 1, reason: Patient Preference  Intervention Accepted By:   Gap Closed?:    Time Spent (min): 5

## 2024-09-13 ENCOUNTER — OFFICE VISIT (OUTPATIENT)
Age: 59
End: 2024-09-13
Payer: COMMERCIAL

## 2024-09-13 VITALS
BODY MASS INDEX: 38.58 KG/M2 | SYSTOLIC BLOOD PRESSURE: 137 MMHG | RESPIRATION RATE: 18 BRPM | DIASTOLIC BLOOD PRESSURE: 81 MMHG | WEIGHT: 217.8 LBS | HEART RATE: 66 BPM | TEMPERATURE: 97.8 F | OXYGEN SATURATION: 97 %

## 2024-09-13 DIAGNOSIS — J30.9 ALLERGIC RHINITIS, UNSPECIFIED SEASONALITY, UNSPECIFIED TRIGGER: ICD-10-CM

## 2024-09-13 DIAGNOSIS — I10 ESSENTIAL (PRIMARY) HYPERTENSION: ICD-10-CM

## 2024-09-13 DIAGNOSIS — G89.29 CHRONIC PAIN OF RIGHT KNEE: Primary | ICD-10-CM

## 2024-09-13 DIAGNOSIS — M25.561 CHRONIC PAIN OF RIGHT KNEE: Primary | ICD-10-CM

## 2024-09-13 DIAGNOSIS — R60.0 BILATERAL LOWER EXTREMITY EDEMA: ICD-10-CM

## 2024-09-13 DIAGNOSIS — I83.813 VARICOSE VEINS OF BOTH LOWER EXTREMITIES WITH PAIN: ICD-10-CM

## 2024-09-13 DIAGNOSIS — E11.9 TYPE 2 DIABETES MELLITUS WITHOUT COMPLICATION, UNSPECIFIED WHETHER LONG TERM INSULIN USE (HCC): ICD-10-CM

## 2024-09-13 DIAGNOSIS — Z85.3 HISTORY OF BREAST CANCER: ICD-10-CM

## 2024-09-13 DIAGNOSIS — Z80.0 FAMILY HISTORY OF COLON CANCER: ICD-10-CM

## 2024-09-13 PROCEDURE — 3044F HG A1C LEVEL LT 7.0%: CPT | Performed by: NURSE PRACTITIONER

## 2024-09-13 PROCEDURE — 3075F SYST BP GE 130 - 139MM HG: CPT | Performed by: NURSE PRACTITIONER

## 2024-09-13 PROCEDURE — 99214 OFFICE O/P EST MOD 30 MIN: CPT | Performed by: NURSE PRACTITIONER

## 2024-09-13 PROCEDURE — 3079F DIAST BP 80-89 MM HG: CPT | Performed by: NURSE PRACTITIONER

## 2024-09-13 RX ORDER — ASCORBIC ACID 500 MG
500 TABLET ORAL DAILY
COMMUNITY
Start: 2020-11-25

## 2024-09-13 RX ORDER — PREDNISONE 10 MG/1
10 TABLET ORAL 2 TIMES DAILY
Qty: 10 TABLET | Refills: 0 | Status: SHIPPED | OUTPATIENT
Start: 2024-09-13 | End: 2024-09-18

## 2024-09-13 ASSESSMENT — PATIENT HEALTH QUESTIONNAIRE - PHQ9
SUM OF ALL RESPONSES TO PHQ QUESTIONS 1-9: 0
SUM OF ALL RESPONSES TO PHQ QUESTIONS 1-9: 0
1. LITTLE INTEREST OR PLEASURE IN DOING THINGS: NOT AT ALL
SUM OF ALL RESPONSES TO PHQ QUESTIONS 1-9: 0
2. FEELING DOWN, DEPRESSED OR HOPELESS: NOT AT ALL
SUM OF ALL RESPONSES TO PHQ9 QUESTIONS 1 & 2: 0
SUM OF ALL RESPONSES TO PHQ QUESTIONS 1-9: 0

## 2024-09-14 LAB
ALBUMIN SERPL-MCNC: 4 G/DL (ref 3.8–4.9)
ALBUMIN/CREAT UR: 5 MG/G CREAT (ref 0–29)
ALP SERPL-CCNC: 63 IU/L (ref 44–121)
ALT SERPL-CCNC: 20 IU/L (ref 0–32)
APPEARANCE UR: CLEAR
AST SERPL-CCNC: 20 IU/L (ref 0–40)
BILIRUB SERPL-MCNC: <0.2 MG/DL (ref 0–1.2)
BILIRUB UR QL STRIP: NEGATIVE
BUN SERPL-MCNC: 14 MG/DL (ref 8–27)
BUN/CREAT SERPL: 23 (ref 12–28)
CALCIUM SERPL-MCNC: 9.2 MG/DL (ref 8.7–10.3)
CHLORIDE SERPL-SCNC: 105 MMOL/L (ref 96–106)
CHOLEST SERPL-MCNC: 145 MG/DL (ref 100–199)
CO2 SERPL-SCNC: 23 MMOL/L (ref 20–29)
COLOR UR: YELLOW
CREAT SERPL-MCNC: 0.62 MG/DL (ref 0.57–1)
CREAT UR-MCNC: 67.1 MG/DL
EGFRCR SERPLBLD CKD-EPI 2021: 102 ML/MIN/1.73
ERYTHROCYTE [DISTWIDTH] IN BLOOD BY AUTOMATED COUNT: 13.2 % (ref 11.7–15.4)
GLOBULIN SER CALC-MCNC: 2.5 G/DL (ref 1.5–4.5)
GLUCOSE SERPL-MCNC: 78 MG/DL (ref 70–99)
GLUCOSE UR QL STRIP: NEGATIVE
HBA1C MFR BLD: 6 % (ref 4.8–5.6)
HCT VFR BLD AUTO: 34.5 % (ref 34–46.6)
HDLC SERPL-MCNC: 49 MG/DL
HGB BLD-MCNC: 11.3 G/DL (ref 11.1–15.9)
HGB UR QL STRIP: NEGATIVE
KETONES UR QL STRIP: NEGATIVE
LDLC SERPL CALC-MCNC: 83 MG/DL (ref 0–99)
LEUKOCYTE ESTERASE UR QL STRIP: NEGATIVE
MCH RBC QN AUTO: 30.5 PG (ref 26.6–33)
MCHC RBC AUTO-ENTMCNC: 32.8 G/DL (ref 31.5–35.7)
MCV RBC AUTO: 93 FL (ref 79–97)
MICROALBUMIN UR-MCNC: 3.1 UG/ML
NITRITE UR QL STRIP: NEGATIVE
PH UR STRIP: 7 [PH] (ref 5–7.5)
PLATELET # BLD AUTO: 204 X10E3/UL (ref 150–450)
POTASSIUM SERPL-SCNC: 4.3 MMOL/L (ref 3.5–5.2)
PROT SERPL-MCNC: 6.5 G/DL (ref 6–8.5)
PROT UR QL STRIP: NEGATIVE
RBC # BLD AUTO: 3.7 X10E6/UL (ref 3.77–5.28)
SODIUM SERPL-SCNC: 142 MMOL/L (ref 134–144)
SP GR UR STRIP: 1.02 (ref 1–1.03)
TRIGL SERPL-MCNC: 65 MG/DL (ref 0–149)
TSH SERPL DL<=0.005 MIU/L-ACNC: 1.75 UIU/ML (ref 0.45–4.5)
UROBILINOGEN UR STRIP-MCNC: 0.2 MG/DL (ref 0.2–1)
VLDLC SERPL CALC-MCNC: 13 MG/DL (ref 5–40)
WBC # BLD AUTO: 5.2 X10E3/UL (ref 3.4–10.8)

## 2024-09-15 PROBLEM — Z15.09 HEREDITARY NONPOLYPOSIS COLORECTAL CANCER SYNDROME: Status: ACTIVE | Noted: 2024-09-15

## 2024-09-15 PROBLEM — Z85.3 HISTORY OF MALIGNANT NEOPLASM OF BREAST: Status: ACTIVE | Noted: 2024-09-15

## 2024-09-15 LAB
IMP & REVIEW OF LAB RESULTS: NORMAL
Lab: NORMAL

## 2024-09-15 RX ORDER — DICLOFENAC SODIUM 75 MG/1
TABLET, DELAYED RELEASE ORAL
Qty: 180 TABLET | Refills: 1 | Status: SHIPPED | OUTPATIENT
Start: 2024-09-15

## 2024-09-15 RX ORDER — FEXOFENADINE HCL 180 MG/1
180 TABLET ORAL DAILY
Qty: 90 TABLET | Refills: 2 | Status: SHIPPED | OUTPATIENT
Start: 2024-09-15

## 2024-09-15 RX ORDER — FLUTICASONE PROPIONATE 50 MCG
2 SPRAY, SUSPENSION (ML) NASAL DAILY
Qty: 48 G | Refills: 2 | Status: SHIPPED | OUTPATIENT
Start: 2024-09-15

## 2024-09-15 RX ORDER — ALBUTEROL SULFATE 90 UG/1
2 AEROSOL, METERED RESPIRATORY (INHALATION) EVERY 4 HOURS PRN
Qty: 18 G | Refills: 3 | Status: SHIPPED | OUTPATIENT
Start: 2024-09-15

## 2024-09-15 RX ORDER — LIDOCAINE 50 MG/G
PATCH TOPICAL
Qty: 90 PATCH | Refills: 2 | Status: SHIPPED | OUTPATIENT
Start: 2024-09-15

## 2024-09-15 RX ORDER — LOSARTAN POTASSIUM 25 MG/1
25 TABLET ORAL DAILY
Qty: 90 TABLET | Refills: 2 | Status: SHIPPED | OUTPATIENT
Start: 2024-09-15

## 2024-09-15 RX ORDER — AMLODIPINE BESYLATE 5 MG/1
5 TABLET ORAL DAILY
Qty: 90 TABLET | Refills: 2 | Status: SHIPPED | OUTPATIENT
Start: 2024-09-15

## 2024-10-05 DIAGNOSIS — J40 BRONCHITIS: ICD-10-CM

## 2024-10-07 RX ORDER — BENZONATATE 200 MG/1
200 CAPSULE ORAL 3 TIMES DAILY PRN
Qty: 30 CAPSULE | Refills: 0 | Status: SHIPPED | OUTPATIENT
Start: 2024-10-07

## 2024-10-07 NOTE — TELEPHONE ENCOUNTER
Last appointment: 9/13/24  Next appointment: 2/17/25  Previous refill encounter(s): 2/13/24 #30    Requested Prescriptions     Pending Prescriptions Disp Refills    benzonatate (TESSALON) 200 MG capsule [Pharmacy Med Name: Benzonatate 200 MG Oral Capsule] 30 capsule 0     Sig: Take 1 capsule by mouth three times daily as needed for cough         For Pharmacy Admin Tracking Only    Program: Medication Refill  CPA in place:    Recommendation Provided To:   Intervention Detail: New Rx: 1, reason: Patient Preference  Intervention Accepted By:   Gap Closed?:    Time Spent (min): 5

## 2024-10-15 NOTE — TELEPHONE ENCOUNTER
I cannot locate this med anywhere on patient's med history. Please sign if appropriate.    Last appointment: 9/13/24  Next appointment: 2/17/25    Requested Prescriptions     Pending Prescriptions Disp Refills    omeprazole (PRILOSEC) 20 MG delayed release capsule [Pharmacy Med Name: Omeprazole 20 MG Oral Capsule Delayed Release] 30 capsule 0     Sig: Take 1 capsule by mouth once daily for 30 days         For Pharmacy Admin Tracking Only    Program: Medication Refill  CPA in place:    Recommendation Provided To:   Intervention Detail: New Rx: 1, reason: Patient Preference  Intervention Accepted By:   Gap Closed?:    Time Spent (min): 5

## 2024-10-21 ENCOUNTER — TELEPHONE (OUTPATIENT)
Age: 59
End: 2024-10-21

## 2024-10-22 ENCOUNTER — TELEPHONE (OUTPATIENT)
Age: 59
End: 2024-10-22

## 2024-10-29 ENCOUNTER — TELEPHONE (OUTPATIENT)
Age: 59
End: 2024-10-29

## 2024-10-29 DIAGNOSIS — T36.95XA ANTIBIOTIC-INDUCED YEAST INFECTION: ICD-10-CM

## 2024-10-29 DIAGNOSIS — B37.9 ANTIBIOTIC-INDUCED YEAST INFECTION: ICD-10-CM

## 2024-10-29 RX ORDER — FLUCONAZOLE 150 MG/1
150 TABLET ORAL
Qty: 6 TABLET | Refills: 0 | Status: SHIPPED | OUTPATIENT
Start: 2024-10-29 | End: 2024-11-14

## 2024-10-29 NOTE — TELEPHONE ENCOUNTER
Orders Placed This Encounter    fluconazole (DIFLUCAN) 150 MG tablet     Sig: Take 1 tablet by mouth every 72 hours for 6 doses     Dispense:  6 tablet     Refill:  0     Patient complains of thrush in mouth/tongue

## 2024-12-27 ENCOUNTER — CLINICAL DOCUMENTATION (OUTPATIENT)
Age: 59
End: 2024-12-27

## 2025-02-17 ENCOUNTER — OFFICE VISIT (OUTPATIENT)
Age: 60
End: 2025-02-17
Payer: MEDICAID

## 2025-02-17 VITALS
WEIGHT: 212.8 LBS | OXYGEN SATURATION: 98 % | SYSTOLIC BLOOD PRESSURE: 126 MMHG | BODY MASS INDEX: 37.7 KG/M2 | RESPIRATION RATE: 20 BRPM | DIASTOLIC BLOOD PRESSURE: 87 MMHG | HEART RATE: 67 BPM | HEIGHT: 63 IN | TEMPERATURE: 98 F

## 2025-02-17 DIAGNOSIS — E11.9 CONTROLLED TYPE 2 DIABETES MELLITUS WITHOUT COMPLICATION, WITHOUT LONG-TERM CURRENT USE OF INSULIN (HCC): ICD-10-CM

## 2025-02-17 DIAGNOSIS — K21.9 GASTROESOPHAGEAL REFLUX DISEASE WITHOUT ESOPHAGITIS: ICD-10-CM

## 2025-02-17 DIAGNOSIS — J45.20 MILD INTERMITTENT ASTHMA WITHOUT COMPLICATION: ICD-10-CM

## 2025-02-17 DIAGNOSIS — I10 ESSENTIAL HYPERTENSION WITH GOAL BLOOD PRESSURE LESS THAN 130/80: ICD-10-CM

## 2025-02-17 DIAGNOSIS — Z76.89 ENCOUNTER TO ESTABLISH CARE: Primary | ICD-10-CM

## 2025-02-17 DIAGNOSIS — J30.9 ALLERGIC RHINITIS, UNSPECIFIED SEASONALITY, UNSPECIFIED TRIGGER: ICD-10-CM

## 2025-02-17 PROBLEM — Z17.0 ESTROGEN RECEPTOR POSITIVE NEOPLASM: Status: RESOLVED | Noted: 2025-02-17 | Resolved: 2025-02-17

## 2025-02-17 PROBLEM — D49.9 ESTROGEN RECEPTOR POSITIVE NEOPLASM: Status: RESOLVED | Noted: 2025-02-17 | Resolved: 2025-02-17

## 2025-02-17 LAB — HBA1C MFR BLD: 5.9 %

## 2025-02-17 PROCEDURE — 83036 HEMOGLOBIN GLYCOSYLATED A1C: CPT | Performed by: STUDENT IN AN ORGANIZED HEALTH CARE EDUCATION/TRAINING PROGRAM

## 2025-02-17 PROCEDURE — 3074F SYST BP LT 130 MM HG: CPT | Performed by: STUDENT IN AN ORGANIZED HEALTH CARE EDUCATION/TRAINING PROGRAM

## 2025-02-17 PROCEDURE — 99214 OFFICE O/P EST MOD 30 MIN: CPT | Performed by: STUDENT IN AN ORGANIZED HEALTH CARE EDUCATION/TRAINING PROGRAM

## 2025-02-17 PROCEDURE — PBSHW AMB POC HEMOGLOBIN A1C: Performed by: STUDENT IN AN ORGANIZED HEALTH CARE EDUCATION/TRAINING PROGRAM

## 2025-02-17 PROCEDURE — 3079F DIAST BP 80-89 MM HG: CPT | Performed by: STUDENT IN AN ORGANIZED HEALTH CARE EDUCATION/TRAINING PROGRAM

## 2025-02-17 PROCEDURE — 3044F HG A1C LEVEL LT 7.0%: CPT | Performed by: STUDENT IN AN ORGANIZED HEALTH CARE EDUCATION/TRAINING PROGRAM

## 2025-02-17 RX ORDER — ALBUTEROL SULFATE 90 UG/1
2 AEROSOL, METERED RESPIRATORY (INHALATION) EVERY 4 HOURS PRN
Qty: 18 G | Refills: 3 | Status: SHIPPED | OUTPATIENT
Start: 2025-02-17

## 2025-02-17 RX ORDER — NEOMYCIN SULFATE, POLYMYXIN B SULFATE AND DEXAMETHASONE 3.5; 10000; 1 MG/ML; [USP'U]/ML; MG/ML
SUSPENSION/ DROPS OPHTHALMIC
COMMUNITY
Start: 2025-01-04

## 2025-02-17 RX ORDER — LOSARTAN POTASSIUM 25 MG/1
25 TABLET ORAL DAILY
Qty: 90 TABLET | Refills: 2 | Status: SHIPPED | OUTPATIENT
Start: 2025-02-17

## 2025-02-17 RX ORDER — FAMOTIDINE 40 MG/1
40 TABLET, FILM COATED ORAL EVERY EVENING
Qty: 90 TABLET | Refills: 1 | Status: SHIPPED | OUTPATIENT
Start: 2025-02-17

## 2025-02-17 RX ORDER — AMLODIPINE BESYLATE 5 MG/1
5 TABLET ORAL DAILY
Qty: 90 TABLET | Refills: 2 | Status: SHIPPED | OUTPATIENT
Start: 2025-02-17

## 2025-02-17 RX ORDER — FEXOFENADINE HCL 180 MG/1
180 TABLET ORAL DAILY
Qty: 90 TABLET | Refills: 2 | Status: SHIPPED | OUTPATIENT
Start: 2025-02-17

## 2025-02-17 SDOH — ECONOMIC STABILITY: FOOD INSECURITY: WITHIN THE PAST 12 MONTHS, YOU WORRIED THAT YOUR FOOD WOULD RUN OUT BEFORE YOU GOT MONEY TO BUY MORE.: NEVER TRUE

## 2025-02-17 SDOH — ECONOMIC STABILITY: FOOD INSECURITY: WITHIN THE PAST 12 MONTHS, THE FOOD YOU BOUGHT JUST DIDN'T LAST AND YOU DIDN'T HAVE MONEY TO GET MORE.: NEVER TRUE

## 2025-02-17 ASSESSMENT — PATIENT HEALTH QUESTIONNAIRE - PHQ9
SUM OF ALL RESPONSES TO PHQ QUESTIONS 1-9: 0
1. LITTLE INTEREST OR PLEASURE IN DOING THINGS: NOT AT ALL
SUM OF ALL RESPONSES TO PHQ QUESTIONS 1-9: 0
SUM OF ALL RESPONSES TO PHQ QUESTIONS 1-9: 0
2. FEELING DOWN, DEPRESSED OR HOPELESS: NOT AT ALL
SUM OF ALL RESPONSES TO PHQ QUESTIONS 1-9: 0
SUM OF ALL RESPONSES TO PHQ9 QUESTIONS 1 & 2: 0

## 2025-02-17 NOTE — PROGRESS NOTES
Chief Complaint   Patient presents with    Establish Care       \"Have you been to the ER, urgent care clinic since your last visit?  Hospitalized since your last visit?\"    NO    “Have you seen or consulted any other health care providers outside of [facility] since your last visit?”    NO    Have you had a mammogram?”   NO    Date of last Mammogram: 2022         “Have you had a colorectal cancer screening such as a colonoscopy/FIT/Cologuard?    NO    Date of last Colonoscopy: 2021  No cologuard on file  No FIT/FOBT on file   No flexible sigmoidoscopy on file         Click Here for Release of Records Request     No results found for this visit on 25.   Vitals:    25 1120   BP: 126/87   Site: Left Upper Arm   Position: Sitting   Cuff Size: Large Adult   Pulse: 67   Resp: 20   Temp: 98 °F (36.7 °C)   TempSrc: Temporal   SpO2: 98%   Weight: 96.5 kg (212 lb 12.8 oz)   Height: 1.588 m (5' 2.5\")      Health Maintenance Due   Topic Date Due    Diabetic retinal exam  Never done    Pneumococcal 50+ years Vaccine (1 of 2 - PCV) Never done    Colorectal Cancer Screen  2022    Breast cancer screen  2023    Diabetic foot exam  2024        The patient, Eli De León, identity was verified by name and .   
Ht 1.588 m (5' 2.5\")   Wt 96.5 kg (212 lb 12.8 oz)   LMP  (LMP Unknown)   SpO2 98%   BMI 38.30 kg/m²   Physical Exam  Vitals reviewed.   Constitutional:       General: She is not in acute distress.     Appearance: She is not toxic-appearing.   HENT:      Head: Normocephalic and atraumatic.      Right Ear: External ear normal.      Left Ear: External ear normal.   Eyes:      Extraocular Movements: Extraocular movements intact.      Conjunctiva/sclera: Conjunctivae normal.      Pupils: Pupils are equal, round, and reactive to light.   Cardiovascular:      Rate and Rhythm: Normal rate and regular rhythm.   Pulmonary:      Effort: Pulmonary effort is normal. No respiratory distress.      Breath sounds: Normal breath sounds.   Skin:     General: Skin is warm and dry.   Neurological:      General: No focal deficit present.      Mental Status: She is alert and oriented to person, place, and time.   Psychiatric:         Mood and Affect: Mood normal.         Behavior: Behavior normal.          Recent Results (from the past 24 hour(s))   AMB POC HEMOGLOBIN A1C    Collection Time: 02/17/25 11:15 AM   Result Value Ref Range    Hemoglobin A1C, POC 5.9 %          Assessment and Plan   1. Encounter to establish care  Patient welcomed to the practice, and chart updated with the collected information as appropriate.       2. Essential hypertension with goal blood pressure less than 130/80  Chronic, controlled. Continue current regimen as described in the HPI and reconciled medication list    - Basic Metabolic Panel; Future  - CBC with Auto Differential; Future  - amLODIPine (NORVASC) 5 MG tablet; Take 1 tablet by mouth daily  Dispense: 90 tablet; Refill: 2  - losartan (COZAAR) 25 MG tablet; Take 1 tablet by mouth daily  Dispense: 90 tablet; Refill: 2    3. Mild intermittent asthma without complication  Chronic, controlled. Continue current regimen as described in the HPI and reconciled medication list    - VENTOLIN  (90

## 2025-02-18 ENCOUNTER — CLINICAL DOCUMENTATION (OUTPATIENT)
Age: 60
End: 2025-02-18

## 2025-02-18 LAB
BASOPHILS # BLD AUTO: 0.1 X10E3/UL (ref 0–0.2)
BASOPHILS NFR BLD AUTO: 1 %
BUN SERPL-MCNC: 18 MG/DL (ref 8–27)
BUN/CREAT SERPL: 23 (ref 12–28)
CALCIUM SERPL-MCNC: 9 MG/DL (ref 8.7–10.3)
CHLORIDE SERPL-SCNC: 106 MMOL/L (ref 96–106)
CO2 SERPL-SCNC: 22 MMOL/L (ref 20–29)
CREAT SERPL-MCNC: 0.79 MG/DL (ref 0.57–1)
EGFRCR SERPLBLD CKD-EPI 2021: 85 ML/MIN/1.73
EOSINOPHIL # BLD AUTO: 0.1 X10E3/UL (ref 0–0.4)
EOSINOPHIL NFR BLD AUTO: 3 %
ERYTHROCYTE [DISTWIDTH] IN BLOOD BY AUTOMATED COUNT: 13.2 % (ref 11.7–15.4)
GLUCOSE SERPL-MCNC: 90 MG/DL (ref 70–99)
HCT VFR BLD AUTO: 32.8 % (ref 34–46.6)
HGB BLD-MCNC: 10.6 G/DL (ref 11.1–15.9)
IMM GRANULOCYTES # BLD AUTO: 0 X10E3/UL (ref 0–0.1)
IMM GRANULOCYTES NFR BLD AUTO: 0 %
LYMPHOCYTES # BLD AUTO: 1.8 X10E3/UL (ref 0.7–3.1)
LYMPHOCYTES NFR BLD AUTO: 38 %
MCH RBC QN AUTO: 30.5 PG (ref 26.6–33)
MCHC RBC AUTO-ENTMCNC: 32.3 G/DL (ref 31.5–35.7)
MCV RBC AUTO: 95 FL (ref 79–97)
MONOCYTES # BLD AUTO: 0.4 X10E3/UL (ref 0.1–0.9)
MONOCYTES NFR BLD AUTO: 8 %
NEUTROPHILS # BLD AUTO: 2.4 X10E3/UL (ref 1.4–7)
NEUTROPHILS NFR BLD AUTO: 50 %
PLATELET # BLD AUTO: 208 X10E3/UL (ref 150–450)
POTASSIUM SERPL-SCNC: 4.1 MMOL/L (ref 3.5–5.2)
RBC # BLD AUTO: 3.47 X10E6/UL (ref 3.77–5.28)
SODIUM SERPL-SCNC: 144 MMOL/L (ref 134–144)
WBC # BLD AUTO: 4.8 X10E3/UL (ref 3.4–10.8)

## 2025-03-03 ENCOUNTER — TELEPHONE (OUTPATIENT)
Age: 60
End: 2025-03-03

## 2025-03-03 NOTE — TELEPHONE ENCOUNTER
Patient states that she need  to send her something into the pharmacy for her ears and a cold she can be reached @ [phone]

## 2025-05-16 DIAGNOSIS — G89.29 CHRONIC PAIN OF RIGHT KNEE: ICD-10-CM

## 2025-05-16 DIAGNOSIS — M25.561 CHRONIC PAIN OF RIGHT KNEE: ICD-10-CM

## 2025-05-16 RX ORDER — DICLOFENAC SODIUM 75 MG/1
TABLET, DELAYED RELEASE ORAL
Qty: 180 TABLET | Refills: 1 | Status: SHIPPED | OUTPATIENT
Start: 2025-05-16

## 2025-05-16 NOTE — TELEPHONE ENCOUNTER
Last appointment: 2/17/25  Next appointment: Advised to follow-up 8/17/25  Previous refill encounter(s): 9/15/24 #180 with 1 refill    Requested Prescriptions     Pending Prescriptions Disp Refills    diclofenac (VOLTAREN) 75 MG EC tablet [Pharmacy Med Name: Diclofenac Sodium 75 MG Oral Tablet Delayed Release] 180 tablet 1     Sig: TAKE 1 TABLET BY MOUTH TWICE DAILY AS NEEDED FOR  KNEE  PAIN         For Pharmacy Admin Tracking Only    Program: Medication Refill  CPA in place:    Recommendation Provided To:   Intervention Detail: New Rx: 1, reason: Patient Preference  Intervention Accepted By:   Gap Closed?:    Time Spent (min): 5

## 2025-07-07 ENCOUNTER — TELEPHONE (OUTPATIENT)
Age: 60
End: 2025-07-07

## 2025-07-07 NOTE — TELEPHONE ENCOUNTER
Patient requesting a visit for leg and ankle swelling from taking Amlodipine, follow up from left ear, throat drainage. Prescription for rash on abdomen.  No appointments open until October 29, . Patient can be reached at [phone].  Phone was disconnected during the conversation. Left a message for patient that Her message was sent  to Clinical staff  for review.

## 2025-07-08 ENCOUNTER — TELEPHONE (OUTPATIENT)
Age: 60
End: 2025-07-08

## 2025-07-28 ENCOUNTER — OFFICE VISIT (OUTPATIENT)
Age: 60
End: 2025-07-28
Payer: MEDICAID

## 2025-07-28 VITALS
WEIGHT: 209 LBS | HEIGHT: 62 IN | SYSTOLIC BLOOD PRESSURE: 123 MMHG | BODY MASS INDEX: 38.46 KG/M2 | HEART RATE: 65 BPM | OXYGEN SATURATION: 99 % | RESPIRATION RATE: 20 BRPM | TEMPERATURE: 98 F | DIASTOLIC BLOOD PRESSURE: 85 MMHG

## 2025-07-28 DIAGNOSIS — H93.8X3 EAR PRESSURE, BILATERAL: ICD-10-CM

## 2025-07-28 DIAGNOSIS — B37.2 SKIN CANDIDIASIS: ICD-10-CM

## 2025-07-28 DIAGNOSIS — J45.20 MILD INTERMITTENT ASTHMA WITHOUT COMPLICATION: ICD-10-CM

## 2025-07-28 DIAGNOSIS — R60.0 BILATERAL LOWER EXTREMITY EDEMA: ICD-10-CM

## 2025-07-28 DIAGNOSIS — I10 ESSENTIAL HYPERTENSION WITH GOAL BLOOD PRESSURE LESS THAN 130/80: Primary | ICD-10-CM

## 2025-07-28 PROCEDURE — 99214 OFFICE O/P EST MOD 30 MIN: CPT | Performed by: STUDENT IN AN ORGANIZED HEALTH CARE EDUCATION/TRAINING PROGRAM

## 2025-07-28 PROCEDURE — 3074F SYST BP LT 130 MM HG: CPT | Performed by: STUDENT IN AN ORGANIZED HEALTH CARE EDUCATION/TRAINING PROGRAM

## 2025-07-28 PROCEDURE — 3079F DIAST BP 80-89 MM HG: CPT | Performed by: STUDENT IN AN ORGANIZED HEALTH CARE EDUCATION/TRAINING PROGRAM

## 2025-07-28 RX ORDER — ALBUTEROL SULFATE 90 UG/1
2 AEROSOL, METERED RESPIRATORY (INHALATION) EVERY 4 HOURS PRN
Qty: 18 G | Refills: 3 | Status: SHIPPED | OUTPATIENT
Start: 2025-07-28

## 2025-07-28 RX ORDER — AZELASTINE 1 MG/ML
2 SPRAY, METERED NASAL 2 TIMES DAILY
Qty: 30 ML | Refills: 2 | Status: SHIPPED | OUTPATIENT
Start: 2025-07-28

## 2025-07-28 RX ORDER — NYSTATIN 100000 [USP'U]/G
POWDER TOPICAL
Qty: 60 G | Refills: 3 | Status: SHIPPED | OUTPATIENT
Start: 2025-07-28

## 2025-07-28 RX ORDER — PREDNISONE 20 MG/1
40 TABLET ORAL DAILY
Qty: 10 TABLET | Refills: 0 | Status: SHIPPED | OUTPATIENT
Start: 2025-07-28 | End: 2025-08-02

## 2025-07-28 RX ORDER — OLMESARTAN MEDOXOMIL AND HYDROCHLOROTHIAZIDE 20/12.5 20; 12.5 MG/1; MG/1
1 TABLET ORAL DAILY
Qty: 90 TABLET | Refills: 1 | Status: SHIPPED | OUTPATIENT
Start: 2025-07-28

## 2025-07-28 ASSESSMENT — PATIENT HEALTH QUESTIONNAIRE - PHQ9
SUM OF ALL RESPONSES TO PHQ QUESTIONS 1-9: 0
1. LITTLE INTEREST OR PLEASURE IN DOING THINGS: NOT AT ALL
2. FEELING DOWN, DEPRESSED OR HOPELESS: NOT AT ALL
SUM OF ALL RESPONSES TO PHQ QUESTIONS 1-9: 0

## 2025-07-28 NOTE — PROGRESS NOTES
Chief Complaint   Patient presents with    Follow-up     Patient complain of left ear pain and swelling in both legs       \"Have you been to the ER, urgent care clinic since your last visit?  Hospitalized since your last visit?\"    NO    “Have you seen or consulted any other health care providers outside of [facility] since your last visit?”    NO    Have you had a mammogram?”   NO    Date of last Mammogram: 2022         “Have you had a colorectal cancer screening such as a colonoscopy/FIT/Cologuard?    NO    Date of last Colonoscopy: 2021  No cologuard on file  No FIT/FOBT on file   No flexible sigmoidoscopy on file         Click Here for Release of Records Request     No results found for this visit on 25.   Vitals:    25 1213   BP: 123/85   Pulse: 65   Resp: 20   Temp: 98 °F (36.7 °C)   TempSrc: Temporal   SpO2: 99%   Weight: 94.8 kg (209 lb)   Height: 1.58 m (5' 2.2\")      Health Maintenance Due   Topic Date Due    Pneumococcal 50+ years Vaccine (1 of 2 - PCV) Never done    Colorectal Cancer Screen  2022    Breast cancer screen  2023    Diabetic foot exam  2024        The patient, Eli De León, identity was verified by name and .

## 2025-07-28 NOTE — PROGRESS NOTES
[facility]  [address] [place] [address] [phone]    Office Visit      Assessment and Plan      Diagnosis Orders   1. Essential hypertension with goal blood pressure less than 130/80  olmesartan-hydroCHLOROthiazide (BENICAR HCT) 20-12.5 MG per tablet      2. Bilateral lower extremity edema  olmesartan-hydroCHLOROthiazide (BENICAR HCT) 20-12.5 MG per tablet      3. Ear pressure, bilateral  azelastine (ASTELIN) 0.1 % nasal spray    predniSONE (DELTASONE) 20 MG tablet      4. Skin candidiasis  nystatin (MYCOSTATIN) 834410 UNIT/GM powder      5. Mild intermittent asthma without complication  VENTOLIN  (90 Base) MCG/ACT inhaler            Assessment & Plan  Leg swelling  -Status: Chronic, stable.  The leg swelling is likely due to fluid retention exacerbated by lymphedema, rather than a side effect of amlodipine.  --Treatment plan: Discontinue losartan 25 mg and amlodipine 5 mg; prescribe olmesartan hydrochlorothiazide to consolidate medications    Right ear drainage  -Status: Chronic, uncontrolled.  Right ear canal shows signs of irritation but no infection; clear fluid behind the eardrum.  --Treatment plan: Prescribe a 5-day course of prednisone 40 mg daily and switch nasal spray to azelastine nasal spray.    Candidiasis of skin  -Status: Acute, uncontrolled.  Rash occurs annually, likely exacerbated by wearing a brace in hot weather.  --Treatment plan: Prescribe nystatin powder.    Mild intermittent asthma  Coughing at night, exacerbated by lack of air conditioning.  --Treatment plan: Refill inhaler prescription.  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        Discussed the expected course, resolution and complications of the diagnosis(es) in

## 2025-08-08 RX ORDER — AMLODIPINE BESYLATE 5 MG/1
5 TABLET ORAL DAILY
Qty: 90 TABLET | Refills: 1 | Status: SHIPPED | OUTPATIENT
Start: 2025-08-08 | End: 2025-08-08 | Stop reason: SDUPTHER

## 2025-08-08 RX ORDER — LOSARTAN POTASSIUM 25 MG/1
25 TABLET ORAL DAILY
Qty: 90 TABLET | Refills: 1 | Status: SHIPPED | OUTPATIENT
Start: 2025-08-08 | End: 2025-08-08 | Stop reason: SDUPTHER